# Patient Record
Sex: FEMALE | Race: WHITE | NOT HISPANIC OR LATINO | Employment: OTHER | URBAN - NONMETROPOLITAN AREA
[De-identification: names, ages, dates, MRNs, and addresses within clinical notes are randomized per-mention and may not be internally consistent; named-entity substitution may affect disease eponyms.]

---

## 2017-02-14 RX ORDER — CELECOXIB 100 MG/1
CAPSULE ORAL
Qty: 30 CAP | Refills: 2 | Status: SHIPPED | OUTPATIENT
Start: 2017-02-14 | End: 2017-05-12 | Stop reason: SDUPTHER

## 2017-02-27 RX ORDER — PRAVASTATIN SODIUM 20 MG
TABLET ORAL
Qty: 90 TAB | Refills: 3 | Status: SHIPPED | OUTPATIENT
Start: 2017-02-27 | End: 2017-11-30 | Stop reason: SDUPTHER

## 2017-03-03 RX ORDER — LOVASTATIN 20 MG/1
TABLET ORAL
Qty: 90 TAB | Refills: 1 | Status: SHIPPED | OUTPATIENT
Start: 2017-03-03 | End: 2017-09-05

## 2017-03-06 ENCOUNTER — TELEPHONE (OUTPATIENT)
Dept: MEDICAL GROUP | Facility: PHYSICIAN GROUP | Age: 62
End: 2017-03-06

## 2017-05-12 RX ORDER — CELECOXIB 100 MG/1
CAPSULE ORAL
Qty: 30 CAP | Refills: 5 | Status: SHIPPED | OUTPATIENT
Start: 2017-05-12 | End: 2017-10-31 | Stop reason: SDUPTHER

## 2017-05-15 ENCOUNTER — HOSPITAL ENCOUNTER (OUTPATIENT)
Dept: LAB | Facility: MEDICAL CENTER | Age: 62
End: 2017-05-15
Attending: INTERNAL MEDICINE
Payer: COMMERCIAL

## 2017-05-15 DIAGNOSIS — E66.9 OBESITY (BMI 30-39.9): ICD-10-CM

## 2017-05-15 DIAGNOSIS — E78.00 HIGH CHOLESTEROL: ICD-10-CM

## 2017-05-15 DIAGNOSIS — E55.9 VITAMIN D DEFICIENCY: ICD-10-CM

## 2017-05-15 LAB
25(OH)D3 SERPL-MCNC: 40 NG/ML (ref 30–100)
ALBUMIN SERPL BCP-MCNC: 4.1 G/DL (ref 3.2–4.9)
ALBUMIN/GLOB SERPL: 1.4 G/DL
ALP SERPL-CCNC: 76 U/L (ref 30–99)
ALT SERPL-CCNC: 7 U/L (ref 2–50)
ANION GAP SERPL CALC-SCNC: 6 MMOL/L (ref 0–11.9)
AST SERPL-CCNC: 16 U/L (ref 12–45)
BASOPHILS # BLD AUTO: 0.9 % (ref 0–1.8)
BASOPHILS # BLD: 0.05 K/UL (ref 0–0.12)
BILIRUB SERPL-MCNC: 1.2 MG/DL (ref 0.1–1.5)
BUN SERPL-MCNC: 12 MG/DL (ref 8–22)
CALCIUM SERPL-MCNC: 9.6 MG/DL (ref 8.5–10.5)
CHLORIDE SERPL-SCNC: 105 MMOL/L (ref 96–112)
CHOLEST SERPL-MCNC: 172 MG/DL (ref 100–199)
CO2 SERPL-SCNC: 28 MMOL/L (ref 20–33)
CREAT SERPL-MCNC: 0.58 MG/DL (ref 0.5–1.4)
EOSINOPHIL # BLD AUTO: 0.13 K/UL (ref 0–0.51)
EOSINOPHIL NFR BLD: 2.4 % (ref 0–6.9)
ERYTHROCYTE [DISTWIDTH] IN BLOOD BY AUTOMATED COUNT: 42.8 FL (ref 35.9–50)
GFR SERPL CREATININE-BSD FRML MDRD: >60 ML/MIN/1.73 M 2
GLOBULIN SER CALC-MCNC: 3 G/DL (ref 1.9–3.5)
GLUCOSE SERPL-MCNC: 85 MG/DL (ref 65–99)
HCT VFR BLD AUTO: 41.9 % (ref 37–47)
HDLC SERPL-MCNC: 64 MG/DL
HGB BLD-MCNC: 14 G/DL (ref 12–16)
IMM GRANULOCYTES # BLD AUTO: 0 K/UL (ref 0–0.11)
IMM GRANULOCYTES NFR BLD AUTO: 0 % (ref 0–0.9)
LDLC SERPL CALC-MCNC: 83 MG/DL
LYMPHOCYTES # BLD AUTO: 2.58 K/UL (ref 1–4.8)
LYMPHOCYTES NFR BLD: 46.7 % (ref 22–41)
MCH RBC QN AUTO: 31 PG (ref 27–33)
MCHC RBC AUTO-ENTMCNC: 33.4 G/DL (ref 33.6–35)
MCV RBC AUTO: 92.9 FL (ref 81.4–97.8)
MONOCYTES # BLD AUTO: 0.42 K/UL (ref 0–0.85)
MONOCYTES NFR BLD AUTO: 7.6 % (ref 0–13.4)
NEUTROPHILS # BLD AUTO: 2.35 K/UL (ref 2–7.15)
NEUTROPHILS NFR BLD: 42.4 % (ref 44–72)
NRBC # BLD AUTO: 0 K/UL
NRBC BLD AUTO-RTO: 0 /100 WBC
PLATELET # BLD AUTO: 196 K/UL (ref 164–446)
PMV BLD AUTO: 11.8 FL (ref 9–12.9)
POTASSIUM SERPL-SCNC: 3.9 MMOL/L (ref 3.6–5.5)
PROT SERPL-MCNC: 7.1 G/DL (ref 6–8.2)
RBC # BLD AUTO: 4.51 M/UL (ref 4.2–5.4)
SODIUM SERPL-SCNC: 139 MMOL/L (ref 135–145)
TRIGL SERPL-MCNC: 125 MG/DL (ref 0–149)
WBC # BLD AUTO: 5.5 K/UL (ref 4.8–10.8)

## 2017-05-15 PROCEDURE — 85025 COMPLETE CBC W/AUTO DIFF WBC: CPT

## 2017-05-15 PROCEDURE — 82306 VITAMIN D 25 HYDROXY: CPT

## 2017-05-15 PROCEDURE — 80053 COMPREHEN METABOLIC PANEL: CPT

## 2017-05-15 PROCEDURE — 36415 COLL VENOUS BLD VENIPUNCTURE: CPT

## 2017-05-15 PROCEDURE — 80061 LIPID PANEL: CPT

## 2017-05-22 ENCOUNTER — OFFICE VISIT (OUTPATIENT)
Dept: MEDICAL GROUP | Facility: PHYSICIAN GROUP | Age: 62
End: 2017-05-22
Payer: COMMERCIAL

## 2017-05-22 VITALS
HEART RATE: 72 BPM | BODY MASS INDEX: 31.66 KG/M2 | SYSTOLIC BLOOD PRESSURE: 118 MMHG | TEMPERATURE: 98.2 F | DIASTOLIC BLOOD PRESSURE: 72 MMHG | HEIGHT: 66 IN | WEIGHT: 197 LBS | RESPIRATION RATE: 16 BRPM | OXYGEN SATURATION: 96 %

## 2017-05-22 DIAGNOSIS — R00.2 PALPITATIONS: ICD-10-CM

## 2017-05-22 DIAGNOSIS — I10 ESSENTIAL HYPERTENSION: ICD-10-CM

## 2017-05-22 PROCEDURE — 99214 OFFICE O/P EST MOD 30 MIN: CPT | Performed by: NURSE PRACTITIONER

## 2017-05-22 RX ORDER — DILTIAZEM HYDROCHLORIDE 120 MG/1
CAPSULE, COATED, EXTENDED RELEASE ORAL
Qty: 90 CAP | Refills: 1 | Status: SHIPPED | OUTPATIENT
Start: 2017-05-22 | End: 2017-11-14 | Stop reason: SDUPTHER

## 2017-05-22 ASSESSMENT — PAIN SCALES - GENERAL: PAINLEVEL: NO PAIN

## 2017-05-22 NOTE — PROGRESS NOTES
[facility]  Primary Care Office Visit - Problem-Oriented        History:     Magali Davidson is a 62 y.o. female who is here today to discuss Labs Only      HTN (hypertension)  This is a chronic condition which is well controlled on medications. Patient is tolerating medications without side effects.      Palpitations  This is a chronic condition which is well controlled on medications. Patient is tolerating medications without side effects.            Past Medical History   Diagnosis Date   • High cholesterol    • Insomnia    • Anesthesia      PONV   • Pain      hip   • Cerumen debris on tympanic membrane of both ears 2014     intermittent problem, ok for now, monitor   • Obesity (BMI 30-39.9) 2014   • Vitamin D deficiency 2015   • Varicose vein of leg 2016     Past Surgical History   Procedure Laterality Date   • Knee arthrotomy       right knee   • Hip arthroplasty total       left   • Tubal ligation     • Primary c section          • Appendectomy     • Hip revision total  2012     Performed by Ihsan Leija M.D. at SURGERY [facility]   • Pr inj for sacroiliac jt anesth Left 2015     Procedure: INJ, SACROILIAC, ANES/STEROID;  Surgeon: Geovanni Nina D.O.;  Location: SURGERY SURGICAL [facility] ORS;  Service: Pain Management     Social History     Social History   • Marital Status:      Spouse Name: N/A   • Number of Children: N/A   • Years of Education: N/A     Occupational History   • Not on file.     Social History Main Topics   • Smoking status: Never Smoker    • Smokeless tobacco: Never Used   • Alcohol Use: No      Comment: rare   • Drug Use: No   • Sexual Activity: No     Other Topics Concern   • Not on file     Social History Narrative    ,  smokes.  Retired,  special ed.       History   Smoking status   • Never Smoker    Smokeless tobacco   • Never Used     Family History   Problem Relation Age  "of Onset   • Heart Disease     • Diabetes     • Cancer     • Hypertension     • Hypertension Mother    • Hyperlipidemia Mother    • Heart Disease Father    • Hypertension Father    • Hyperlipidemia Father    • Stroke Father    • Heart Disease Brother    • Hypertension Brother    • Hypertension Brother      Allergies   Allergen Reactions   • Nkda [No Known Drug Allergy]        Problem List:     Patient Active Problem List    Diagnosis Date Noted   • HTN (hypertension) 11/14/2013     Priority: Medium     Class: Chronic   • Varicose vein of leg 02/19/2016   • Vitamin D deficiency 11/19/2015   • Sprain of sacroiliac ligament 08/24/2015   • History of colonic polyps 08/16/2015   • Obesity (BMI 30-39.9) 11/07/2014   • Health care maintenance 09/25/2014   • Palpitations 09/25/2014   • Cerumen debris on tympanic membrane of both ears 09/25/2014   • Hx of total hip arthroplasty 09/26/2012   • High cholesterol    • Insomnia          Medications:     Current outpatient prescriptions:   •  diltiazem CD (CARDIZEM CD) 120 MG CAPSULE SR 24 HR, TAKE ONE CAPSULE BY MOUTH ONE TIME DAILY, Disp: 90 Cap, Rfl: 1  •  celecoxib (CELEBREX) 100 MG Cap, TAKE ONE CAPSULE BY MOUTH ONE TIME DAILY, Disp: 30 Cap, Rfl: 5  •  pravastatin (PRAVACHOL) 20 MG Tab, TAKE 1 TAB BY MOUTH EVERY DAY., Disp: 90 Tab, Rfl: 3  •  zolpidem (AMBIEN) 10 MG Tab, Take 1 Tab by mouth at bedtime as needed., Disp: 30 Each, Rfl: 5  •  cholecalciferol (VITAMIN D3) 5000 UNIT Cap, Take 1 Cap by mouth every day., Disp: 90 Cap, Rfl: 3  •  lovastatin (MEVACOR) 20 MG Tab, TAKE ONE TABLET BY MOUTH ONE TIME DAILY (Patient not taking: Reported on 5/22/2017), Disp: 90 Tab, Rfl: 1      Review of Systems:     Comprehensive ROS negative     Physical Assessment:     VS: /72 mmHg  Pulse 72  Temp(Src) 36.8 °C (98.2 °F)  Resp 16  Ht 1.676 m (5' 6\")  Wt 89.359 kg (197 lb)  BMI 31.81 kg/m2  SpO2 96%    General: Well-developed, well-nourished, female   Head: PERRL, EOMI. " Normocephalic  Cardiovasc:No JVD.  RRR, no MRG. No thrills or bruits. Pulses 2+ and symmetric at all distal extremities.  Pulmonary: Lungs clear bilaterally.  Normal respiratory effort. No wheeze or crackles.   Extremities: No edema, no TTP bilateral calves. Pedal pulses intact. No joint effusions. LEs warm and well-perfused.  Neuro: Alert and oriented, CNs II-XII intact, no focal deficits.  Skin:No rashes noted. Skin warm, dry, intact    Psych: Dressed appropriately for the weather, pleasant and conversant.  Affect, mood & judgment appropriate.      Recent Labs & Imaging:   LABS: Results reviewed and discussed with the patient, all questions answered.      Assessment/Plan:   Magali was seen today for labs only.    Diagnoses and all orders for this visit:    Essential hypertension, chronic, well controlled   - Continue current treatment, follow-up in 6 months  -     diltiazem CD (CARDIZEM CD) 120 MG CAPSULE SR 24 HR; TAKE ONE CAPSULE BY MOUTH ONE TIME DAILY    Palpitations, well controlled  -     diltiazem CD (CARDIZEM CD) 120 MG CAPSULE SR 24 HR; TAKE ONE CAPSULE BY MOUTH ONE TIME DAILY      Patient is agreeable to the above plan and voiced understanding. All questions answered.     Please note that this dictation was created using voice recognition software. I have made every reasonable attempt to correct obvious errors, but I expect that there are errors of grammar and possibly content that I did not discover before finalizing the note.      JM Linda  5/22/2017, 2:05 PM

## 2017-05-22 NOTE — MR AVS SNAPSHOT
"        Magali Chiu Stuart   2017 1:40 PM   Office Visit   MRN: [id number]    Department:  [facility]   Dept Phone:  [phone]    Description:  Female : 1955   Provider:  ANGIE Erwin           Reason for Visit     Labs Only Completed 5/15/17      Allergies as of 2017     Allergen Noted Reactions    Nkda [No Known Drug Allergy] 2012         You were diagnosed with     Essential hypertension   [8762637]       Palpitations   [785.1.ICD-9-CM]         Vital Signs     Blood Pressure Pulse Temperature Respirations Height Weight    118/72 mmHg 72 36.8 °C (98.2 °F) 16 1.676 m (5' 6\") 89.359 kg (197 lb)    Body Mass Index Oxygen Saturation Smoking Status             31.81 kg/m2 96% Never Smoker          Basic Information     Date Of Birth Sex Race Ethnicity Preferred Language    1955 Female White Non- English      Problem List              ICD-10-CM Priority Class Noted - Resolved    High cholesterol E78.00   Unknown - Present    Insomnia G47.00   Unknown - Present    Hx of total hip arthroplasty Z96.649   2012 - Present    HTN (hypertension) I10 Medium Chronic 2013 - Present    Health care maintenance Z00.00   2014 - Present    Palpitations R00.2   2014 - Present    Cerumen debris on tympanic membrane of both ears H61.23   2014 - Present    Obesity (BMI 30-39.9) E66.9   2014 - Present    History of colonic polyps Z86.010   2015 - Present    Sprain of sacroiliac ligament S33.6XXA   2015 - Present    Vitamin D deficiency E55.9   2015 - Present    Varicose vein of leg I83.90   2016 - Present      Health Maintenance        Date Due Completion Dates    PAP SMEAR 2017 (Prv Comp)    Override on 2014: Previously completed (done by another provider normal)    MAMMOGRAM 2018    COLONOSCOPY 2020 (N/S), 2015    Override on 2015: (N/S) (polyp due in )    IMM " DTaP/Tdap/Td Vaccine (2 - Td) 5/19/2026 5/19/2016            Current Immunizations     13-VALENT PCV PREVNAR 4/3/2016    Influenza TIV (IM) 9/27/2012  9:32 PM    SHINGLES VACCINE 4/3/2016    Tdap Vaccine 5/19/2016      Below and/or attached are the medications your provider expects you to take. Review all of your home medications and newly ordered medications with your provider and/or pharmacist. Follow medication instructions as directed by your provider and/or pharmacist. Please keep your medication list with you and share with your provider. Update the information when medications are discontinued, doses are changed, or new medications (including over-the-counter products) are added; and carry medication information at all times in the event of emergency situations     Allergies:  NKDA - (reactions not documented)               Medications  Valid as of: May 22, 2017 -  2:04 PM    Generic Name Brand Name Tablet Size Instructions for use    Celecoxib (Cap) CELEBREX 100 MG TAKE ONE CAPSULE BY MOUTH ONE TIME DAILY        Cholecalciferol (Cap) VITAMIN D3 5000 UNIT Take 1 Cap by mouth every day.        DilTIAZem HCl Coated Beads (CAPSULE SR 24 HR) CARDIZEM  MG TAKE ONE CAPSULE BY MOUTH ONE TIME DAILY        Lovastatin (Tab) MEVACOR 20 MG TAKE ONE TABLET BY MOUTH ONE TIME DAILY        Pravastatin Sodium (Tab) PRAVACHOL 20 MG TAKE 1 TAB BY MOUTH EVERY DAY.        Zolpidem Tartrate (Tab) AMBIEN 10 MG Take 1 Tab by mouth at bedtime as needed.        .                 Medicines prescribed today were sent to:     [facility] [place] [id number] [address]    Phone: [phone] Fax: [phone]    Open 24 Hours?: No      Medication refill instructions:       If your prescription bottle indicates you have medication refills left, it is not necessary to call your provider’s office. Please contact your pharmacy and they will refill your medication.    If your  prescription bottle indicates you do not have any refills left, you may request refills at any time through one of the following ways: The online [employer] system (except Urgent Care), by calling your provider’s office, or by asking your pharmacy to contact your provider’s office with a refill request. Medication refills are processed only during regular business hours and may not be available until the next business day. Your provider may request additional information or to have a follow-up visit with you prior to refilling your medication.   *Please Note: Medication refills are assigned a new Rx number when refilled electronically. Your pharmacy may indicate that no refills were authorized even though a new prescription for the same medication is available at the pharmacy. Please request the medicine by name with the pharmacy before contacting your provider for a refill.           [employer] Access Code: Activation code not generated  Current [employer] Status: Active

## 2017-06-27 ENCOUNTER — RX ONLY (OUTPATIENT)
Age: 62
Setting detail: RX ONLY
End: 2017-06-27

## 2017-06-27 PROBLEM — D23.39 OTHER BENIGN NEOPLASM OF SKIN OF OTHER PARTS OF FACE: Status: ACTIVE | Noted: 2017-06-27

## 2017-07-11 PROBLEM — D49.2 NEOPLASM OF UNSPECIFIED BEHAVIOR OF BONE, SOFT TISSUE, AND SKIN: Status: RESOLVED | Noted: 2017-06-27 | Resolved: 2017-07-11

## 2017-09-05 ENCOUNTER — OFFICE VISIT (OUTPATIENT)
Dept: URGENT CARE | Facility: PHYSICIAN GROUP | Age: 62
End: 2017-09-05
Payer: COMMERCIAL

## 2017-09-05 VITALS
WEIGHT: 194 LBS | SYSTOLIC BLOOD PRESSURE: 144 MMHG | RESPIRATION RATE: 16 BRPM | DIASTOLIC BLOOD PRESSURE: 92 MMHG | HEIGHT: 66 IN | TEMPERATURE: 97.8 F | HEART RATE: 92 BPM | BODY MASS INDEX: 31.18 KG/M2 | OXYGEN SATURATION: 98 %

## 2017-09-05 DIAGNOSIS — H61.23 BILATERAL IMPACTED CERUMEN: ICD-10-CM

## 2017-09-05 DIAGNOSIS — J06.9 UPPER RESPIRATORY TRACT INFECTION, UNSPECIFIED TYPE: ICD-10-CM

## 2017-09-05 PROCEDURE — 99214 OFFICE O/P EST MOD 30 MIN: CPT | Performed by: PHYSICIAN ASSISTANT

## 2017-09-05 RX ORDER — FLUTICASONE PROPIONATE 50 MCG
2 SPRAY, SUSPENSION (ML) NASAL DAILY
Qty: 1 BOTTLE | Refills: 1 | Status: SHIPPED | OUTPATIENT
Start: 2017-09-05 | End: 2018-01-05 | Stop reason: SDUPTHER

## 2017-09-05 ASSESSMENT — ENCOUNTER SYMPTOMS
CHILLS: 0
SINUS PAIN: 0
SORE THROAT: 0
RHINORRHEA: 1
WHEEZING: 0
FEVER: 0
COUGH: 0

## 2017-09-05 NOTE — PATIENT INSTRUCTIONS
"  Use Tylenol and/or ibuprofen as needed for pain or fever.  Use a Abhishek Med, Neti Pot, or other nasal irrigation device daily.  Use Flonase daily.  May try a short course of a decongestant such as Sudafed.  May try Afrin nasal spray for 3-5 days and then discontinue use.  May try an over-the-counter antihistamine such as Zyrtec, Claritin, or Allegra.  Use a cool mist humidifier with distilled water.  Elevate the head of your bed a few inches.  Drink plenty of fluids and get adequate rest.  Follow up with primary care provider in a few days if not improving.  Return for new or worsening symptoms.    Upper Respiratory Infection, Adult  Most upper respiratory infections (URIs) are a viral infection of the air passages leading to the lungs. A URI affects the nose, throat, and upper air passages. The most common type of URI is nasopharyngitis and is typically referred to as \"the common cold.\"  URIs run their course and usually go away on their own. Most of the time, a URI does not require medical attention, but sometimes a bacterial infection in the upper airways can follow a viral infection. This is called a secondary infection. Sinus and middle ear infections are common types of secondary upper respiratory infections.  Bacterial pneumonia can also complicate a URI. A URI can worsen asthma and chronic obstructive pulmonary disease (COPD). Sometimes, these complications can require emergency medical care and may be life threatening.   CAUSES  Almost all URIs are caused by viruses. A virus is a type of germ and can spread from one person to another.   RISKS FACTORS  You may be at risk for a URI if:   · You smoke.    · You have chronic heart or lung disease.  · You have a weakened defense (immune) system.    · You are very young or very old.    · You have nasal allergies or asthma.  · You work in crowded or poorly ventilated areas.  · You work in health care facilities or schools.  SIGNS AND SYMPTOMS   Symptoms typically " develop 2-3 days after you come in contact with a cold virus. Most viral URIs last 7-10 days. However, viral URIs from the influenza virus (flu virus) can last 14-18 days and are typically more severe. Symptoms may include:   · Runny or stuffy (congested) nose.    · Sneezing.    · Cough.    · Sore throat.    · Headache.    · Fatigue.    · Fever.    · Loss of appetite.    · Pain in your forehead, behind your eyes, and over your cheekbones (sinus pain).  · Muscle aches.    DIAGNOSIS   Your health care provider may diagnose a URI by:  · Physical exam.  · Tests to check that your symptoms are not due to another condition such as:  ¨ Strep throat.  ¨ Sinusitis.  ¨ Pneumonia.  ¨ Asthma.  TREATMENT   A URI goes away on its own with time. It cannot be cured with medicines, but medicines may be prescribed or recommended to relieve symptoms. Medicines may help:  · Reduce your fever.  · Reduce your cough.  · Relieve nasal congestion.  HOME CARE INSTRUCTIONS   · Take medicines only as directed by your health care provider.    · Gargle warm saltwater or take cough drops to comfort your throat as directed by your health care provider.  · Use a warm mist humidifier or inhale steam from a shower to increase air moisture. This may make it easier to breathe.  · Drink enough fluid to keep your urine clear or pale yellow.    · Eat soups and other clear broths and maintain good nutrition.    · Rest as needed.    · Return to work when your temperature has returned to normal or as your health care provider advises. You may need to stay home longer to avoid infecting others. You can also use a face mask and careful hand washing to prevent spread of the virus.  · Increase the usage of your inhaler if you have asthma.    · Do not use any tobacco products, including cigarettes, chewing tobacco, or electronic cigarettes. If you need help quitting, ask your health care provider.  PREVENTION   The best way to protect yourself from getting a cold  is to practice good hygiene.   · Avoid oral or hand contact with people with cold symptoms.    · Wash your hands often if contact occurs.    There is no clear evidence that vitamin C, vitamin E, echinacea, or exercise reduces the chance of developing a cold. However, it is always recommended to get plenty of rest, exercise, and practice good nutrition.   SEEK MEDICAL CARE IF:   · You are getting worse rather than better.    · Your symptoms are not controlled by medicine.    · You have chills.  · You have worsening shortness of breath.  · You have brown or red mucus.  · You have yellow or brown nasal discharge.  · You have pain in your face, especially when you bend forward.  · You have a fever.  · You have swollen neck glands.  · You have pain while swallowing.  · You have white areas in the back of your throat.  SEEK IMMEDIATE MEDICAL CARE IF:   · You have severe or persistent:  ¨ Headache.  ¨ Ear pain.  ¨ Sinus pain.  ¨ Chest pain.  · You have chronic lung disease and any of the following:  ¨ Wheezing.  ¨ Prolonged cough.  ¨ Coughing up blood.  ¨ A change in your usual mucus.  · You have a stiff neck.  · You have changes in your:  ¨ Vision.  ¨ Hearing.  ¨ Thinking.  ¨ Mood.  MAKE SURE YOU:   · Understand these instructions.  · Will watch your condition.  · Will get help right away if you are not doing well or get worse.     This information is not intended to replace advice given to you by your health care provider. Make sure you discuss any questions you have with your health care provider.     Document Released: 06/13/2002 Document Revised: 05/03/2016 Document Reviewed: 03/25/2015  [employer] Interactive Patient Education ©2016 [employer]      Cerumen Impaction  The structures of the external ear canal secrete a waxy substance known as cerumen. Excess cerumen can build up in the ear canal, causing a condition known as cerumen impaction. Cerumen impaction can cause ear pain and disrupt the function of the  ear.  The rate of cerumen production differs for each individual. In certain individuals, the configuration of the ear canal may decrease his or her ability to naturally remove cerumen.  CAUSES  Cerumen impaction is caused by excessive cerumen production or buildup.  RISK FACTORS  · Frequent use of swabs to clean ears.  · Having narrow ear canals.  · Having eczema.  · Being dehydrated.  SIGNS AND SYMPTOMS  · Diminished hearing.  · Ear drainage.  · Ear pain.  · Ear itch.  TREATMENT  Treatment may involve:  · Over-the-counter or prescription ear drops to soften the cerumen.  · Removal of cerumen by a health care provider. This may be done with:  ¨ Irrigation with warm water. This is the most common method of removal.  ¨ Ear curettes and other instruments.  ¨ Surgery. This may be done in severe cases.  HOME CARE INSTRUCTIONS  · Take medicines only as directed by your health care provider.  · Do not insert objects into the ear with the intent of cleaning the ear.  PREVENTION  · Do not insert objects into the ear, even with the intent of cleaning the ear. Removing cerumen as a part of normal hygiene is not necessary, and the use of swabs in the ear canal is not recommended.  · Drink enough water to keep your urine clear or pale yellow.  · Control your eczema if you have it.  SEEK MEDICAL CARE IF:  · You develop ear pain.  · You develop bleeding from the ear.  · The cerumen does not clear after you use ear drops as directed.     This information is not intended to replace advice given to you by your health care provider. Make sure you discuss any questions you have with your health care provider.     Document Released: 01/25/2006 Document Revised: 01/08/2016 Document Reviewed: 03/17/2011  [employer] Interactive Patient Education ©2016 [employer]

## 2017-09-05 NOTE — PROGRESS NOTES
Subjective:      Magali Davidson is a 62 y.o. female who presents with Nasal Congestion (4 days )            4 day history of rhinorrhea and nasal congestion. No fevers, chills, shortness breath, wheezing, cough, sore throat, or other complaints.      URI    This is a new problem. The current episode started in the past 7 days. The problem has been waxing and waning. There has been no fever. Associated symptoms include congestion and rhinorrhea. Pertinent negatives include no coughing, ear pain, sinus pain, sore throat or wheezing. She has tried nothing for the symptoms. The treatment provided no relief.       Review of Systems   Constitutional: Negative for chills and fever.   HENT: Positive for congestion and rhinorrhea. Negative for ear pain and sore throat.    Respiratory: Negative for cough and wheezing.      Allergies:Nkda [no known drug allergy]    Current Outpatient Prescriptions Ordered in [facility]   Medication Sig Dispense Refill   • fluticasone (FLONASE) 50 MCG/ACT nasal spray Spray 2 Sprays in nose every day. 1 Bottle 1   • diltiazem CD (CARDIZEM CD) 120 MG CAPSULE SR 24 HR TAKE ONE CAPSULE BY MOUTH ONE TIME DAILY 90 Cap 1   • celecoxib (CELEBREX) 100 MG Cap TAKE ONE CAPSULE BY MOUTH ONE TIME DAILY 30 Cap 5   • pravastatin (PRAVACHOL) 20 MG Tab TAKE 1 TAB BY MOUTH EVERY DAY. 90 Tab 3   • cholecalciferol (VITAMIN D3) 5000 UNIT Cap Take 1 Cap by mouth every day. 90 Cap 3   • zolpidem (AMBIEN) 10 MG Tab Take 1 Tab by mouth at bedtime as needed. 30 Each 5     No current Epic-ordered facility-administered medications on file.        Past Medical History:   Diagnosis Date   • Varicose vein of leg 2/19/2016   • Vitamin D deficiency 11/19/2015   • Obesity (BMI 30-39.9) 11/7/2014   • Cerumen debris on tympanic membrane of both ears 9/25/2014    intermittent problem, ok for now, monitor   • Anesthesia     PONV   • High cholesterol    • Insomnia    • Pain     hip       Social History   Substance Use Topics   •  "Smoking status: Never Smoker   • Smokeless tobacco: Never Used   • Alcohol use No      Comment: rare       Family Status   Relation Status   • Mother Alive   • Father  at age 73    [place]ke   • Brother Alive    4 stents   • Paternal Uncle     MI   • Paternal Grandmother     MI   • Brother Alive   • Son Alive    Pericarditis at age 24   •       Family History   Problem Relation Age of Onset   • Hypertension Mother    • Hyperlipidemia Mother    • Heart Disease Father    • Hypertension Father    • Hyperlipidemia Father    • Stroke Father    • Heart Disease Brother    • Hypertension Brother    • Hypertension Brother    • Heart Disease     • Diabetes     • Cancer     • Hypertension            Objective:     /92   Pulse 92   Temp 36.6 °C (97.8 °F)   Resp 16   Ht 1.676 m (5' 6\")   Wt 88 kg (194 lb)   SpO2 98%   Breastfeeding? No   BMI 31.31 kg/m²      Physical Exam   Constitutional: She appears well-developed and well-nourished. No distress.   HENT:   Head: Normocephalic and atraumatic.   Right Ear: External ear normal.   Left Ear: External ear normal.   Mouth/Throat: Oropharynx is clear and moist.   Canals completely obstructed by cerumen. After removal, canals and tympanic membranes unremarkable. Mild nasal mucosal edema with clear nasal mucus   Eyes: Right eye exhibits no discharge. Left eye exhibits no discharge.   Neck: Normal range of motion. Neck supple.   Cardiovascular: Normal rate and regular rhythm.    Pulmonary/Chest: Effort normal and breath sounds normal.   Skin: Skin is warm and dry. She is not diaphoretic.   Psychiatric: She has a normal mood and affect. Her behavior is normal. Judgment and thought content normal.   Nursing note and vitals reviewed.              Assessment/Plan:     1. Upper respiratory tract infection, unspecified type  fluticasone (FLONASE) 50 MCG/ACT nasal spray    Fluctuating for 4 days. No fever. Likely viral. Given written instructions. Follow-up " with PCP. Return if worsening   2. Bilateral impacted cerumen      Canals cleared in clinic. Given written instructions. Follow-up as needed       [employer] Interactive Patient Education given:URI, cerumen impaction    Please note that this dictation was created using voice recognition software. I have made every reasonable attempt to correct obvious errors, but I expect that there are errors of grammar and possibly content that I did not discover before finalizing the note.

## 2017-10-31 RX ORDER — CELECOXIB 100 MG/1
CAPSULE ORAL
Qty: 30 CAP | Refills: 5 | Status: SHIPPED | OUTPATIENT
Start: 2017-10-31 | End: 2017-11-30 | Stop reason: SDUPTHER

## 2017-11-14 DIAGNOSIS — I10 ESSENTIAL HYPERTENSION: ICD-10-CM

## 2017-11-14 DIAGNOSIS — R00.2 PALPITATIONS: ICD-10-CM

## 2017-11-14 RX ORDER — DILTIAZEM HYDROCHLORIDE 120 MG/1
CAPSULE, COATED, EXTENDED RELEASE ORAL
Qty: 90 CAP | Refills: 0 | Status: SHIPPED | OUTPATIENT
Start: 2017-11-14 | End: 2017-11-30 | Stop reason: SDUPTHER

## 2017-11-14 NOTE — TELEPHONE ENCOUNTER
Was the patient seen in the last year in this department? Yes     Does patient have an active prescription for medications requested? No     Received Request Via: Pharmacy      Pt met protocol?: Yes pt last ov 5/17   BP Readings from Last 1 Encounters:   09/05/17 144/92

## 2017-11-20 ENCOUNTER — HOSPITAL ENCOUNTER (OUTPATIENT)
Dept: LAB | Facility: MEDICAL CENTER | Age: 62
End: 2017-11-20
Attending: INTERNAL MEDICINE
Payer: COMMERCIAL

## 2017-11-20 DIAGNOSIS — I10 ESSENTIAL HYPERTENSION: ICD-10-CM

## 2017-11-20 DIAGNOSIS — E78.00 HIGH CHOLESTEROL: ICD-10-CM

## 2017-11-20 DIAGNOSIS — E55.9 VITAMIN D DEFICIENCY: ICD-10-CM

## 2017-11-20 LAB
25(OH)D3 SERPL-MCNC: 38 NG/ML (ref 30–100)
ALBUMIN SERPL BCP-MCNC: 4 G/DL (ref 3.2–4.9)
ALBUMIN/GLOB SERPL: 1.3 G/DL
ALP SERPL-CCNC: 87 U/L (ref 30–99)
ALT SERPL-CCNC: 8 U/L (ref 2–50)
ANION GAP SERPL CALC-SCNC: 8 MMOL/L (ref 0–11.9)
AST SERPL-CCNC: 17 U/L (ref 12–45)
BASOPHILS # BLD AUTO: 1.1 % (ref 0–1.8)
BASOPHILS # BLD: 0.06 K/UL (ref 0–0.12)
BILIRUB SERPL-MCNC: 1.1 MG/DL (ref 0.1–1.5)
BUN SERPL-MCNC: 16 MG/DL (ref 8–22)
CALCIUM SERPL-MCNC: 9.6 MG/DL (ref 8.5–10.5)
CHLORIDE SERPL-SCNC: 106 MMOL/L (ref 96–112)
CHOLEST SERPL-MCNC: 169 MG/DL (ref 100–199)
CO2 SERPL-SCNC: 28 MMOL/L (ref 20–33)
CREAT SERPL-MCNC: 0.66 MG/DL (ref 0.5–1.4)
EOSINOPHIL # BLD AUTO: 0.12 K/UL (ref 0–0.51)
EOSINOPHIL NFR BLD: 2.1 % (ref 0–6.9)
ERYTHROCYTE [DISTWIDTH] IN BLOOD BY AUTOMATED COUNT: 43.2 FL (ref 35.9–50)
GFR SERPL CREATININE-BSD FRML MDRD: >60 ML/MIN/1.73 M 2
GLOBULIN SER CALC-MCNC: 3 G/DL (ref 1.9–3.5)
GLUCOSE SERPL-MCNC: 82 MG/DL (ref 65–99)
HCT VFR BLD AUTO: 42.7 % (ref 37–47)
HDLC SERPL-MCNC: 65 MG/DL
HGB BLD-MCNC: 14 G/DL (ref 12–16)
IMM GRANULOCYTES # BLD AUTO: 0.01 K/UL (ref 0–0.11)
IMM GRANULOCYTES NFR BLD AUTO: 0.2 % (ref 0–0.9)
LDLC SERPL CALC-MCNC: 89 MG/DL
LYMPHOCYTES # BLD AUTO: 2.25 K/UL (ref 1–4.8)
LYMPHOCYTES NFR BLD: 39.4 % (ref 22–41)
MCH RBC QN AUTO: 31 PG (ref 27–33)
MCHC RBC AUTO-ENTMCNC: 32.8 G/DL (ref 33.6–35)
MCV RBC AUTO: 94.5 FL (ref 81.4–97.8)
MONOCYTES # BLD AUTO: 0.48 K/UL (ref 0–0.85)
MONOCYTES NFR BLD AUTO: 8.4 % (ref 0–13.4)
NEUTROPHILS # BLD AUTO: 2.79 K/UL (ref 2–7.15)
NEUTROPHILS NFR BLD: 48.8 % (ref 44–72)
NRBC # BLD AUTO: 0 K/UL
NRBC BLD AUTO-RTO: 0 /100 WBC
PLATELET # BLD AUTO: 218 K/UL (ref 164–446)
PMV BLD AUTO: 12.1 FL (ref 9–12.9)
POTASSIUM SERPL-SCNC: 4.3 MMOL/L (ref 3.6–5.5)
PROT SERPL-MCNC: 7 G/DL (ref 6–8.2)
RBC # BLD AUTO: 4.52 M/UL (ref 4.2–5.4)
SODIUM SERPL-SCNC: 142 MMOL/L (ref 135–145)
TRIGL SERPL-MCNC: 77 MG/DL (ref 0–149)
WBC # BLD AUTO: 5.7 K/UL (ref 4.8–10.8)

## 2017-11-20 PROCEDURE — 80053 COMPREHEN METABOLIC PANEL: CPT

## 2017-11-20 PROCEDURE — 85025 COMPLETE CBC W/AUTO DIFF WBC: CPT

## 2017-11-20 PROCEDURE — 82306 VITAMIN D 25 HYDROXY: CPT

## 2017-11-20 PROCEDURE — 36415 COLL VENOUS BLD VENIPUNCTURE: CPT

## 2017-11-20 PROCEDURE — 80061 LIPID PANEL: CPT

## 2017-11-30 ENCOUNTER — OFFICE VISIT (OUTPATIENT)
Dept: MEDICAL GROUP | Facility: PHYSICIAN GROUP | Age: 62
End: 2017-11-30
Payer: COMMERCIAL

## 2017-11-30 VITALS
BODY MASS INDEX: 31.18 KG/M2 | HEART RATE: 77 BPM | SYSTOLIC BLOOD PRESSURE: 100 MMHG | WEIGHT: 194 LBS | DIASTOLIC BLOOD PRESSURE: 80 MMHG | RESPIRATION RATE: 16 BRPM | HEIGHT: 66 IN | TEMPERATURE: 98.6 F | OXYGEN SATURATION: 95 %

## 2017-11-30 DIAGNOSIS — R00.2 PALPITATIONS: ICD-10-CM

## 2017-11-30 DIAGNOSIS — E78.00 HIGH CHOLESTEROL: ICD-10-CM

## 2017-11-30 DIAGNOSIS — E66.9 OBESITY (BMI 30-39.9): ICD-10-CM

## 2017-11-30 DIAGNOSIS — I10 ESSENTIAL HYPERTENSION: ICD-10-CM

## 2017-11-30 DIAGNOSIS — Z96.643 HISTORY OF TOTAL REPLACEMENT OF BOTH HIP JOINTS: ICD-10-CM

## 2017-11-30 PROCEDURE — 99214 OFFICE O/P EST MOD 30 MIN: CPT | Performed by: INTERNAL MEDICINE

## 2017-11-30 RX ORDER — DILTIAZEM HYDROCHLORIDE 120 MG/1
CAPSULE, COATED, EXTENDED RELEASE ORAL
Qty: 90 CAP | Refills: 3 | Status: SHIPPED | OUTPATIENT
Start: 2017-11-30 | End: 2018-12-20 | Stop reason: SDUPTHER

## 2017-11-30 RX ORDER — CELECOXIB 100 MG/1
100 CAPSULE ORAL DAILY
Qty: 90 CAP | Refills: 3 | Status: SHIPPED | OUTPATIENT
Start: 2017-11-30 | End: 2018-09-13 | Stop reason: SDUPTHER

## 2017-11-30 RX ORDER — PRAVASTATIN SODIUM 20 MG
20 TABLET ORAL
Qty: 90 TAB | Refills: 3 | Status: SHIPPED | OUTPATIENT
Start: 2017-11-30 | End: 2019-01-29 | Stop reason: SDUPTHER

## 2017-11-30 ASSESSMENT — PATIENT HEALTH QUESTIONNAIRE - PHQ9: CLINICAL INTERPRETATION OF PHQ2 SCORE: 0

## 2017-11-30 ASSESSMENT — PAIN SCALES - GENERAL: PAINLEVEL: NO PAIN

## 2017-11-30 NOTE — ASSESSMENT & PLAN NOTE
Patient not following at home.  Reports some occasional palpitations, usually in clusters for a few days and then not for several mos. She denies fatigue or lite headed sensations.

## 2017-11-30 NOTE — PROGRESS NOTES
Chief Complaint   Patient presents with   • Hypertension     medication FV/ lab results        HISTORY OF PRESENT ILLNESS: Patient is a 62 y.o. female established patient who presents today to discuss the medical issues below.    HTN (hypertension)  Patient not following at home.  Reports some occasional palpitations, usually in clusters for a few days and then not for several mos. She denies fatigue or lite headed sensations.      High cholesterol  Patient continues on the pravastatin at 20 mg had labs, working on the diet and weight but a bit frustrated recently.      Palpitations  States rare brief palpitations.      Hx of total hip arthroplasty  Patient taking the celebrex 100 mg daily  She has aching if cold, not exercising as much.      Obesity (BMI 30-39.9)  Continues to work on diet.  Feels stalled.        Patient Active Problem List    Diagnosis Date Noted   • HTN (hypertension) 11/14/2013     Priority: Medium     Class: Chronic   • Varicose vein of leg 02/19/2016   • Vitamin D deficiency 11/19/2015   • Sprain of sacroiliac ligament 08/24/2015   • History of colonic polyps 08/16/2015   • Obesity (BMI 30-39.9) 11/07/2014   • Health care maintenance 09/25/2014   • Palpitations 09/25/2014   • Cerumen debris on tympanic membrane of both ears 09/25/2014   • Hx of total hip arthroplasty 09/26/2012   • High cholesterol    • Insomnia        Allergies:Nkda [no known drug allergy]    Current Outpatient Prescriptions   Medication Sig Dispense Refill   • diltiazem CD (CARDIZEM CD) 120 MG CAPSULE SR 24 HR TAKE ONE CAPSULE BY MOUTH ONE TIME DAILY 90 Cap 3   • celecoxib (CELEBREX) 100 MG Cap Take 1 Cap by mouth every day. 90 Cap 3   • pravastatin (PRAVACHOL) 20 MG Tab Take 1 Tab by mouth every day. TAKE 1 TAB BY MOUTH EVERY DAY. 90 Tab 3   • cholecalciferol (VITAMIN D3) 5000 UNIT Cap Take 1 Cap by mouth every day. 90 Cap 3   • fluticasone (FLONASE) 50 MCG/ACT nasal spray Spray 2 Sprays in nose every day. 1 Bottle 1   •  "zolpidem (AMBIEN) 10 MG Tab Take 1 Tab by mouth at bedtime as needed. 30 Each 5     No current facility-administered medications for this visit.          Past Medical History:   Diagnosis Date   • Anesthesia     PONV   • Cerumen debris on tympanic membrane of both ears 2014    intermittent problem, ok for now, monitor   • High cholesterol    • Insomnia    • Obesity (BMI 30-39.9) 2014   • Pain     hip   • Varicose vein of leg 2016   • Vitamin D deficiency 2015       Social History   Substance Use Topics   • Smoking status: Never Smoker   • Smokeless tobacco: Never Used   • Alcohol use No      Comment: rare       Family Status   Relation Status   • Mother Alive   • Father  at age 73    Stoke   • Brother Alive    4 stents   • Paternal Uncle     MI   • Paternal Grandmother     MI   • Brother Alive   • Son Alive    Pericarditis at age 24   •       Family History   Problem Relation Age of Onset   • Hypertension Mother    • Hyperlipidemia Mother    • Heart Disease Father    • Hypertension Father    • Hyperlipidemia Father    • Stroke Father    • Heart Disease Brother    • Hypertension Brother    • Hypertension Brother    • Heart Disease     • Diabetes     • Cancer     • Hypertension         ROS:    Respiratory: Negative for cough, sputum production, shortness of breath or wheezing.    Cardiovascular: Negative for chest pain, palpitations, orthopnea, dyspnea with exertion or edema.   Gastrointestinal: Negative for GI upset, nausea, vomiting, abdominal pain, constipation or diarrhea.   Genitourinary: Negative for dysuria, urgency, hesitancy or frequency.       Exam:    Blood pressure 100/80, pulse 77, temperature 37 °C (98.6 °F), resp. rate 16, height 1.676 m (5' 6\"), weight 88 kg (194 lb), SpO2 95 %.  General:  Well nourished, well developed female in NAD.  HENT: Normocephalic, bilateral TMs are intact, nasal and oral mucosa with no lesions,   Neck: Supple without bruit. Thyroid " is not enlarged.  Pulmonary: Clear to ausculation and percussion.  Normal effort. No rales, rhonchi, or wheezing.  Cardiovascular: Regular rate and rhythm without murmur.   Abdomen: Normal bowel sounds soft and nontender no palpable liver spleen bladder mass.  Extremities: No LE edema noted.  Neuro: Grossly nonfocal.  Psych: Alert and oriented to person, place, and time. Appropriate mood and conversation.  LABS: Results reviewed and discussed with the patient, questions answered.      This dictation was created using voice recognition software. I have made reasonable attempts to correct errors, however, errors of grammar and content may exist.          Assessment/Plan:    1. Essential hypertension  Blood pressure on the low side. Currently on the diltiazem for palpitations and this is significantly improved. She is asymptomatic with the lower blood pressures discussed ongoing monitoring. No change to dosage currently.  - COMP METABOLIC PANEL; Future  - CBC WITH DIFFERENTIAL; Future  - diltiazem CD (CARDIZEM CD) 120 MG CAPSULE SR 24 HR; TAKE ONE CAPSULE BY MOUTH ONE TIME DAILY  Dispense: 90 Cap; Refill: 3    2. High cholesterol  Excellent control consideration for decreasing dose of pravastatin but the patient opts for continuing on the 20 mg, ongoing monitoring.  - LIPID PROFILE; Future    3. Palpitations  Clinically stable tolerating the diltiazem ongoing monitoring no change  - diltiazem CD (CARDIZEM CD) 120 MG CAPSULE SR 24 HR; TAKE ONE CAPSULE BY MOUTH ONE TIME DAILY  Dispense: 90 Cap; Refill: 3    4. History of total replacement of both hip joints  Intermittent aches and pains patient concerned about potential risk for long-term Celebrex therapy reviewed risks benefits and ongoing monitoring.    5. Obesity (BMI 30-39.9)  Benefits of further weight loss tools techniques diet exercise weight loss discussed  - Patient identified as having weight management issue.  Appropriate orders and counseling given.        Patient was seen for  25 minutes face to face of which more than 50% of the time was spent in counseling and coordination of care regarding the above problems.

## 2017-11-30 NOTE — ASSESSMENT & PLAN NOTE
Patient continues on the pravastatin at 20 mg had labs, working on the diet and weight but a bit frustrated recently.

## 2018-01-05 DIAGNOSIS — J06.9 UPPER RESPIRATORY TRACT INFECTION, UNSPECIFIED TYPE: ICD-10-CM

## 2018-01-05 RX ORDER — FLUTICASONE PROPIONATE 50 MCG
2 SPRAY, SUSPENSION (ML) NASAL DAILY
Qty: 3 BOTTLE | Refills: 3 | Status: SHIPPED | OUTPATIENT
Start: 2018-01-05 | End: 2020-09-02

## 2018-01-05 NOTE — TELEPHONE ENCOUNTER
Was the patient seen in the last year in this department? Yes     Does patient have an active prescription for medications requested? No     Received Request Via: Pharmacy      Pt met protocol?: Yes    OV  11/17

## 2018-05-24 ENCOUNTER — HOSPITAL ENCOUNTER (OUTPATIENT)
Dept: LAB | Facility: MEDICAL CENTER | Age: 63
End: 2018-05-24
Attending: INTERNAL MEDICINE
Payer: COMMERCIAL

## 2018-05-24 DIAGNOSIS — E78.00 HIGH CHOLESTEROL: ICD-10-CM

## 2018-05-24 DIAGNOSIS — I10 ESSENTIAL HYPERTENSION: ICD-10-CM

## 2018-05-24 LAB
ALBUMIN SERPL BCP-MCNC: 4.1 G/DL (ref 3.2–4.9)
ALBUMIN/GLOB SERPL: 1.5 G/DL
ALP SERPL-CCNC: 86 U/L (ref 30–99)
ALT SERPL-CCNC: 9 U/L (ref 2–50)
ANION GAP SERPL CALC-SCNC: 6 MMOL/L (ref 0–11.9)
AST SERPL-CCNC: 18 U/L (ref 12–45)
BASOPHILS # BLD AUTO: 0.9 % (ref 0–1.8)
BASOPHILS # BLD: 0.05 K/UL (ref 0–0.12)
BILIRUB SERPL-MCNC: 1.5 MG/DL (ref 0.1–1.5)
BUN SERPL-MCNC: 16 MG/DL (ref 8–22)
CALCIUM SERPL-MCNC: 9.6 MG/DL (ref 8.5–10.5)
CHLORIDE SERPL-SCNC: 107 MMOL/L (ref 96–112)
CHOLEST SERPL-MCNC: 189 MG/DL (ref 100–199)
CO2 SERPL-SCNC: 27 MMOL/L (ref 20–33)
CREAT SERPL-MCNC: 0.75 MG/DL (ref 0.5–1.4)
EOSINOPHIL # BLD AUTO: 0.1 K/UL (ref 0–0.51)
EOSINOPHIL NFR BLD: 1.9 % (ref 0–6.9)
ERYTHROCYTE [DISTWIDTH] IN BLOOD BY AUTOMATED COUNT: 42.5 FL (ref 35.9–50)
GLOBULIN SER CALC-MCNC: 2.8 G/DL (ref 1.9–3.5)
GLUCOSE SERPL-MCNC: 79 MG/DL (ref 65–99)
HCT VFR BLD AUTO: 42.2 % (ref 37–47)
HDLC SERPL-MCNC: 68 MG/DL
HGB BLD-MCNC: 14.1 G/DL (ref 12–16)
IMM GRANULOCYTES # BLD AUTO: 0.01 K/UL (ref 0–0.11)
IMM GRANULOCYTES NFR BLD AUTO: 0.2 % (ref 0–0.9)
LDLC SERPL CALC-MCNC: 102 MG/DL
LYMPHOCYTES # BLD AUTO: 2.39 K/UL (ref 1–4.8)
LYMPHOCYTES NFR BLD: 44.4 % (ref 22–41)
MCH RBC QN AUTO: 30.8 PG (ref 27–33)
MCHC RBC AUTO-ENTMCNC: 33.4 G/DL (ref 33.6–35)
MCV RBC AUTO: 92.1 FL (ref 81.4–97.8)
MONOCYTES # BLD AUTO: 0.42 K/UL (ref 0–0.85)
MONOCYTES NFR BLD AUTO: 7.8 % (ref 0–13.4)
NEUTROPHILS # BLD AUTO: 2.41 K/UL (ref 2–7.15)
NEUTROPHILS NFR BLD: 44.8 % (ref 44–72)
NRBC # BLD AUTO: 0 K/UL
NRBC BLD-RTO: 0 /100 WBC
PLATELET # BLD AUTO: 209 K/UL (ref 164–446)
PMV BLD AUTO: 11.9 FL (ref 9–12.9)
POTASSIUM SERPL-SCNC: 4.3 MMOL/L (ref 3.6–5.5)
PROT SERPL-MCNC: 6.9 G/DL (ref 6–8.2)
RBC # BLD AUTO: 4.58 M/UL (ref 4.2–5.4)
SODIUM SERPL-SCNC: 140 MMOL/L (ref 135–145)
TRIGL SERPL-MCNC: 95 MG/DL (ref 0–149)
WBC # BLD AUTO: 5.4 K/UL (ref 4.8–10.8)

## 2018-05-24 PROCEDURE — 80061 LIPID PANEL: CPT

## 2018-05-24 PROCEDURE — 36415 COLL VENOUS BLD VENIPUNCTURE: CPT

## 2018-05-24 PROCEDURE — 80053 COMPREHEN METABOLIC PANEL: CPT

## 2018-05-24 PROCEDURE — 85025 COMPLETE CBC W/AUTO DIFF WBC: CPT

## 2018-06-07 ENCOUNTER — OFFICE VISIT (OUTPATIENT)
Dept: MEDICAL GROUP | Facility: PHYSICIAN GROUP | Age: 63
End: 2018-06-07
Payer: COMMERCIAL

## 2018-06-07 VITALS
HEART RATE: 69 BPM | RESPIRATION RATE: 16 BRPM | OXYGEN SATURATION: 97 % | BODY MASS INDEX: 33.43 KG/M2 | SYSTOLIC BLOOD PRESSURE: 124 MMHG | WEIGHT: 208 LBS | DIASTOLIC BLOOD PRESSURE: 80 MMHG | HEIGHT: 66 IN | TEMPERATURE: 98.1 F

## 2018-06-07 DIAGNOSIS — Z96.643 HISTORY OF TOTAL REPLACEMENT OF BOTH HIP JOINTS: ICD-10-CM

## 2018-06-07 DIAGNOSIS — F51.01 PRIMARY INSOMNIA: ICD-10-CM

## 2018-06-07 DIAGNOSIS — E78.00 HIGH CHOLESTEROL: ICD-10-CM

## 2018-06-07 DIAGNOSIS — Z12.39 BREAST CANCER SCREENING: ICD-10-CM

## 2018-06-07 DIAGNOSIS — I10 ESSENTIAL HYPERTENSION: ICD-10-CM

## 2018-06-07 DIAGNOSIS — E66.9 OBESITY (BMI 30-39.9): ICD-10-CM

## 2018-06-07 DIAGNOSIS — E55.9 VITAMIN D DEFICIENCY: ICD-10-CM

## 2018-06-07 PROCEDURE — 99214 OFFICE O/P EST MOD 30 MIN: CPT | Performed by: INTERNAL MEDICINE

## 2018-06-07 RX ORDER — CHLORAL HYDRATE 500 MG
1000 CAPSULE ORAL
COMMUNITY

## 2018-06-07 ASSESSMENT — PAIN SCALES - GENERAL: PAINLEVEL: NO PAIN

## 2018-06-07 NOTE — ASSESSMENT & PLAN NOTE
Patient continues on medications diltiazem 120 mg a day.  Not following at home no chest pain palpitations or edema.

## 2018-06-07 NOTE — PROGRESS NOTES
Chief Complaint   Patient presents with   • Hypertension     lab results        HISTORY OF PRESENT ILLNESS: Patient is a 63 y.o. female established patient who presents today to discuss the medical issues below.    HTN (hypertension)  Patient continues on medications diltiazem 120 mg a day.  Not following at home no chest pain palpitations or edema.    Vitamin D deficiency  Continues on vitamin D supplementation 5000 international units a day.  Had lab work done.    Obesity (BMI 30-39.9)  Struggling with diet weight has increased.  patient reports she is just back from vacation.     High cholesterol  Patient continues on pravastatin 20 mg a day.  Weight is up.  Supplements with omega-3.    Insomnia  Patient has self discontinued the Ambien.  She reports she has trouble sleeping and then up and down due to the hip aching.  She has just started trial some melatonin.    Hx of total hip arthroplasty  s/p hip replacement, still aching using the Celebrex.       Patient Active Problem List    Diagnosis Date Noted   • Vitamin D deficiency 11/19/2015     Priority: Medium   • Obesity (BMI 30-39.9) 11/07/2014     Priority: Medium   • HTN (hypertension) 11/14/2013     Priority: Medium     Class: Chronic   • Insomnia      Priority: Medium   • Varicose vein of leg 02/19/2016   • Sprain of sacroiliac ligament 08/24/2015   • History of colonic polyps 08/16/2015   • Health care maintenance 09/25/2014   • Palpitations 09/25/2014   • Cerumen debris on tympanic membrane of both ears 09/25/2014   • Hx of total hip arthroplasty 09/26/2012   • High cholesterol        Allergies:Nkda [no known drug allergy]    Current Outpatient Prescriptions   Medication Sig Dispense Refill   • Omega-3 Fatty Acids (FISH OIL) 1000 MG Cap capsule Take 1,000 mg by mouth 3 times a day, with meals.     • MELATONIN PO Take  by mouth.     • fluticasone (FLONASE) 50 MCG/ACT nasal spray Spray 2 Sprays in nose every day. 3 Bottle 3   • diltiazem CD (CARDIZEM CD)  120 MG CAPSULE SR 24 HR TAKE ONE CAPSULE BY MOUTH ONE TIME DAILY 90 Cap 3   • celecoxib (CELEBREX) 100 MG Cap Take 1 Cap by mouth every day. 90 Cap 3   • pravastatin (PRAVACHOL) 20 MG Tab Take 1 Tab by mouth every day. TAKE 1 TAB BY MOUTH EVERY DAY. 90 Tab 3   • cholecalciferol (VITAMIN D3) 5000 UNIT Cap Take 1 Cap by mouth every day. 90 Cap 3   • zolpidem (AMBIEN) 10 MG Tab Take 1 Tab by mouth at bedtime as needed. 30 Each 5     No current facility-administered medications for this visit.          Past Medical History:   Diagnosis Date   • Anesthesia     PONV   • Cerumen debris on tympanic membrane of both ears 2014    intermittent problem, ok for now, monitor   • High cholesterol    • Insomnia    • Obesity (BMI 30-39.9) 2014   • Pain     hip   • Varicose vein of leg 2016   • Vitamin D deficiency 2015       Social History   Substance Use Topics   • Smoking status: Never Smoker   • Smokeless tobacco: Never Used   • Alcohol use No      Comment: rare       Family Status   Relation Status   • Mother Alive   • Father  at age 73    Stoke   • Brother Alive    4 stents   • Paternal Uncle     MI   • Paternal Grandmother     MI   • Brother Alive   • Son Alive    Pericarditis at age 24   •       Family History   Problem Relation Age of Onset   • Hypertension Mother    • Hyperlipidemia Mother    • Heart Disease Father    • Hypertension Father    • Hyperlipidemia Father    • Stroke Father    • Heart Disease Brother    • Hypertension Brother    • Hypertension Brother    • Heart Disease     • Diabetes     • Cancer     • Hypertension         ROS:    Respiratory: Negative for cough, sputum production, shortness of breath or wheezing.    Cardiovascular: Negative for chest pain, palpitations, orthopnea, dyspnea with exertion or edema.   Gastrointestinal: Negative for GI upset, nausea, vomiting, abdominal pain, constipation or diarrhea.   Genitourinary: Negative for dysuria, urgency,  "hesitancy or frequency.       Exam:    Blood pressure 124/80, pulse 69, temperature 36.7 °C (98.1 °F), resp. rate 16, height 1.676 m (5' 6\"), weight 94.3 kg (208 lb), SpO2 97 %.  General:  Well nourished, well developed female in NAD.  HENT: Normocephalic, bilateral TMs are intact, nasal and oral mucosa with no lesions,   Neck: Supple without bruit. Thyroid is not enlarged.  Pulmonary: Clear to ausculation and percussion.  Normal effort. No rales, rhonchi, or wheezing.  Cardiovascular: Regular rate and rhythm without murmur.   Abdomen: Normal bowel sounds soft and nontender no palpable liver spleen bladder mass.  Extremities: No LE edema noted.  Neuro: Grossly nonfocal.  Psych: Alert and oriented to person, place, and time. Appropriate mood and conversation.    LABS: Results reviewed and discussed with the patient, questions answered.    This dictation was created using voice recognition software. I have made reasonable attempts to correct errors, however, errors of grammar and content may exist.          Assessment/Plan:    1. Essential hypertension  Stable on meds, discussed weight loss.    2. Vitamin D deficiency  Continues on supplementation lab assessment not done as she was therapeutic last draw.  Continue present management reviewed with patient    3. Obesity (BMI 30-39.9)  Discussed diet, exercise, weight loss, behavioral modification, portion size management.  Patient declines referral to behavioral health or dietitian  - Patient identified as having weight management issue.  Appropriate orders and counseling given.    4. High cholesterol  LDL of 108 on meds discussed diet exercise weight loss implications.    5. Primary insomnia  Discussed good sleep mechanics homeopathic options support monitor    6. History of total replacement of both hip joints  Some aching contributes to difficulty with sleep discussed regular exercise weight loss Celebrex monitoring.  She admits she has not been doing her nighttime " stretching and strengthening exercises.    7. Breast cancer screening  Due for breast cancer screening  - MA-SCREEN MAMMO W/CAD-BILAT; Future       Patient was seen for  25 minutes face to face of which more than 50% of the time was spent in counseling and coordination of care regarding the above problems.

## 2018-06-07 NOTE — LETTER
June 7, 2018         Patient: Magali Davidson   YOB: 1955   Date of Visit: 6/7/2018           To Whom it May Concern:    Magali Davidson was seen in my clinic on 6/7/2018.  An appropriate weight goal is #170.    If you have any questions or concerns, please don't hesitate to call.        Sincerely,           Ailyn HERNANDEZ M.D.  Electronically Signed

## 2018-06-07 NOTE — ASSESSMENT & PLAN NOTE
Patient has self discontinued the Ambien.  She reports she has trouble sleeping and then up and down due to the hip aching.  She has just started trial some melatonin.

## 2018-06-27 ENCOUNTER — TELEPHONE (OUTPATIENT)
Dept: MEDICAL GROUP | Facility: PHYSICIAN GROUP | Age: 63
End: 2018-06-27

## 2018-06-27 DIAGNOSIS — R92.8 ABNORMAL MAMMOGRAM OF LEFT BREAST: ICD-10-CM

## 2018-06-27 NOTE — TELEPHONE ENCOUNTER
1. Caller Name: Rachna at [facility]                      Call Back Number: [phone]    2. Message: Rachna called and stated that Nancys Mammo came back abnormal so she asked if you would be able to order a diagnostic left breast with Ultrasound if indicated.  We can fax the order to [phone].  Pts Mammo is scanned into media. Please advise     3. Patient approves office to leave a detailed voicemail/MyChart message: N\A

## 2018-07-12 ENCOUNTER — HOSPITAL ENCOUNTER (OUTPATIENT)
Dept: RADIOLOGY | Facility: MEDICAL CENTER | Age: 63
End: 2018-07-12

## 2018-07-19 ENCOUNTER — HOSPITAL ENCOUNTER (OUTPATIENT)
Dept: RADIOLOGY | Facility: MEDICAL CENTER | Age: 63
End: 2018-07-19
Attending: INTERNAL MEDICINE
Payer: COMMERCIAL

## 2018-07-19 DIAGNOSIS — R92.8 ABNORMAL MAMMOGRAM OF LEFT BREAST: ICD-10-CM

## 2018-07-19 PROCEDURE — G0279 TOMOSYNTHESIS, MAMMO: HCPCS | Mod: LT

## 2018-09-13 ENCOUNTER — OFFICE VISIT (OUTPATIENT)
Dept: MEDICAL GROUP | Facility: PHYSICIAN GROUP | Age: 63
End: 2018-09-13
Payer: COMMERCIAL

## 2018-09-13 VITALS
RESPIRATION RATE: 16 BRPM | HEART RATE: 70 BPM | WEIGHT: 202 LBS | OXYGEN SATURATION: 96 % | BODY MASS INDEX: 32.47 KG/M2 | TEMPERATURE: 98.6 F | SYSTOLIC BLOOD PRESSURE: 120 MMHG | DIASTOLIC BLOOD PRESSURE: 82 MMHG | HEIGHT: 66 IN

## 2018-09-13 DIAGNOSIS — R92.8 ABNORMAL MAMMOGRAM OF LEFT BREAST: ICD-10-CM

## 2018-09-13 DIAGNOSIS — F51.01 PRIMARY INSOMNIA: ICD-10-CM

## 2018-09-13 DIAGNOSIS — I10 ESSENTIAL HYPERTENSION: ICD-10-CM

## 2018-09-13 DIAGNOSIS — E66.9 OBESITY (BMI 30-39.9): ICD-10-CM

## 2018-09-13 DIAGNOSIS — E78.00 HIGH CHOLESTEROL: ICD-10-CM

## 2018-09-13 DIAGNOSIS — R92.8 ABNORMAL MAMMOGRAM: ICD-10-CM

## 2018-09-13 DIAGNOSIS — R00.2 PALPITATIONS: ICD-10-CM

## 2018-09-13 PROCEDURE — 99214 OFFICE O/P EST MOD 30 MIN: CPT | Performed by: INTERNAL MEDICINE

## 2018-09-13 RX ORDER — CELECOXIB 100 MG/1
100 CAPSULE ORAL DAILY
Qty: 90 CAP | Refills: 3 | Status: SHIPPED | OUTPATIENT
Start: 2018-09-13 | End: 2019-09-22 | Stop reason: SDUPTHER

## 2018-09-13 RX ORDER — ZOLPIDEM TARTRATE 5 MG/1
5 TABLET ORAL NIGHTLY PRN
Qty: 30 TAB | Refills: 5 | Status: SHIPPED | OUTPATIENT
Start: 2018-09-13 | End: 2018-10-13

## 2018-09-13 NOTE — ASSESSMENT & PLAN NOTE
Patient reports mammogram abnormality had been discussed with her by the radiologist at [facility].  She wonders if she should consider biopsy versus monitoring.  She cannot palpate a lump.  She does have a family history in her mom with breast cancer.

## 2018-09-13 NOTE — ASSESSMENT & PLAN NOTE
Patient reports she is still struggling getting to sleep,estimates 1 hour to fall asleep, awakens q 1 hour.  Would like to go back to the Ambien.

## 2018-09-13 NOTE — PROGRESS NOTES
Chief Complaint   Patient presents with   • Weight Loss     FV on weight        HISTORY OF PRESENT ILLNESS: Patient is a 63 y.o. female established patient who presents today to discuss the medical issues below.    Insomnia  Patient reports she is still struggling getting to sleep,estimates 1 hour to fall asleep, awakens q 1 hour.  Would like to go back to the Ambien.      HTN (hypertension)  Continues with cardizem, not following at home.  No chest pain palpitations or edema.      Obesity (BMI 30-39.9)  Continues to work on portions, some weight drop to  6#.    Palpitations  Patient denies significant palpitations since on the diltiazem.     Abnormal mammogram of left breast  Patient reports mammogram abnormality had been discussed with her by the radiologist at [facility].  She wonders if she should consider biopsy versus monitoring.  She cannot palpate a lump.  She does have a family history in her mom with breast cancer.      Patient Active Problem List    Diagnosis Date Noted   • Vitamin D deficiency 11/19/2015     Priority: Medium   • Obesity (BMI 30-39.9) 11/07/2014     Priority: Medium   • HTN (hypertension) 11/14/2013     Priority: Medium     Class: Chronic   • Insomnia      Priority: Medium   • Abnormal mammogram of left breast 09/13/2018   • Varicose vein of leg 02/19/2016   • Sprain of sacroiliac ligament 08/24/2015   • History of colonic polyps 08/16/2015   • Health care maintenance 09/25/2014   • Palpitations 09/25/2014   • Cerumen debris on tympanic membrane of both ears 09/25/2014   • Hx of total hip arthroplasty 09/26/2012   • High cholesterol        Allergies:Nkda [no known drug allergy]    Current Outpatient Prescriptions   Medication Sig Dispense Refill   • zolpidem (AMBIEN) 5 MG Tab Take 1 Tab by mouth at bedtime as needed for Sleep for up to 30 days. 30 Tab 5   • celecoxib (CELEBREX) 100 MG Cap Take 1 Cap by mouth every day. 90 Cap 3   • Omega-3 Fatty Acids (FISH OIL) 1000 MG Cap capsule Take  1,000 mg by mouth 3 times a day, with meals.     • MELATONIN PO Take  by mouth.     • fluticasone (FLONASE) 50 MCG/ACT nasal spray Spray 2 Sprays in nose every day. 3 Bottle 3   • diltiazem CD (CARDIZEM CD) 120 MG CAPSULE SR 24 HR TAKE ONE CAPSULE BY MOUTH ONE TIME DAILY 90 Cap 3   • pravastatin (PRAVACHOL) 20 MG Tab Take 1 Tab by mouth every day. TAKE 1 TAB BY MOUTH EVERY DAY. 90 Tab 3   • cholecalciferol (VITAMIN D3) 5000 UNIT Cap Take 1 Cap by mouth every day. 90 Cap 3   • zolpidem (AMBIEN) 10 MG Tab Take 1 Tab by mouth at bedtime as needed. 30 Each 5     No current facility-administered medications for this visit.          Past Medical History:   Diagnosis Date   • Anesthesia     PONV   • Cerumen debris on tympanic membrane of both ears 2014    intermittent problem, ok for now, monitor   • High cholesterol    • Insomnia    • Obesity (BMI 30-39.9) 2014   • Pain     hip   • Varicose vein of leg 2016   • Vitamin D deficiency 2015       Social History   Substance Use Topics   • Smoking status: Never Smoker   • Smokeless tobacco: Never Used   • Alcohol use No      Comment: rare       Family Status   Relation Status   • Mo Alive   • Fa  at age 73        Stoke   • Bro Alive        4 stents   • PUnc         MI   • PGMo         MI   • Bro Alive   • Son Alive        Pericarditis at age 24   • Unknown (Not Specified)     Family History   Problem Relation Age of Onset   • Hypertension Mother    • Hyperlipidemia Mother    • Heart Disease Father    • Hypertension Father    • Hyperlipidemia Father    • Stroke Father    • Heart Disease Brother    • Hypertension Brother    • Hypertension Brother    • Heart Disease Unknown    • Diabetes Unknown    • Cancer Unknown    • Hypertension Unknown        ROS:    Respiratory: Negative for cough, sputum production, shortness of breath or wheezing.    Cardiovascular: Negative for chest pain, palpitations, orthopnea, dyspnea with exertion or  "edema.   Gastrointestinal: Negative for GI upset, nausea, vomiting, abdominal pain, constipation or diarrhea.   Genitourinary: Negative for dysuria, urgency, hesitancy or frequency.       Exam:    Blood pressure 120/82, pulse 70, temperature 37 °C (98.6 °F), resp. rate 16, height 1.676 m (5' 6\"), weight 91.6 kg (202 lb), SpO2 96 %.  General:  Well nourished, well developed female in NAD.  Pulmonary: Clear to ausculation and percussion.  Normal effort. No rales, rhonchi, or wheezing.  Cardiovascular: Regular rate and rhythm without murmur.   Abdomen: Normal bowel sounds soft and nontender no palpable liver spleen bladder mass.  Extremities: No LE edema noted.  Neuro: Grossly nonfocal.  Psych: Alert and oriented to person, place, and time. Appropriate mood and conversation.    Reviewed mammogram    This dictation was created using voice recognition software. I have made reasonable attempts to correct errors, however, errors of grammar and content may exist.          Assessment/Plan:    1. Primary insomnia  Discussion held regarding behavior modification, self scripting support monitor as needed Ambien.  - zolpidem (AMBIEN) 5 MG Tab; Take 1 Tab by mouth at bedtime as needed for Sleep for up to 30 days.  Dispense: 30 Tab; Refill: 5    2. Abnormal mammogram  Reviewed mammogram and recommendations.  Patient ultimately opts for conservative management 6 month repeat mammogram.  - MA-UNILATERAL DIAGNOSTIC (CB)-MG LEFT; Future    3. Essential hypertension  Well-controlled on meds continue lab monitoring  - COMP METABOLIC PANEL; Future  - CBC WITH DIFFERENTIAL; Future    4. Obesity (BMI 30-39.9)  Reviewed diet exercise weight loss    5. Palpitations  Rare well-controlled on Cardizem    6. High cholesterol  Continuing on Pravachol lab and liver monitoring discussed  - LIPID PROFILE; Future    7. Abnormal mammogram of left breast  As above    Patient was seen for 25 minutes face to face of which more than 50% of the time was " spent in counseling and coordination of care regarding the above problems.

## 2018-12-20 DIAGNOSIS — I10 ESSENTIAL HYPERTENSION: ICD-10-CM

## 2018-12-20 DIAGNOSIS — R00.2 PALPITATIONS: ICD-10-CM

## 2018-12-21 RX ORDER — DILTIAZEM HYDROCHLORIDE 120 MG/1
CAPSULE, EXTENDED RELEASE ORAL
Qty: 90 CAP | Refills: 3 | Status: SHIPPED | OUTPATIENT
Start: 2018-12-21 | End: 2019-12-02 | Stop reason: SDUPTHER

## 2018-12-21 NOTE — TELEPHONE ENCOUNTER
Was the patient seen in the last year in this department? Yes    Does patient have an active prescription for medications requested? No     Received Request Via: Pharmacy     Last OV 9/13/18  Labs 5/24/18

## 2019-01-21 ENCOUNTER — HOSPITAL ENCOUNTER (OUTPATIENT)
Dept: RADIOLOGY | Facility: MEDICAL CENTER | Age: 64
End: 2019-01-21
Attending: INTERNAL MEDICINE
Payer: COMMERCIAL

## 2019-01-21 DIAGNOSIS — R92.8 ABNORMAL MAMMOGRAM: ICD-10-CM

## 2019-01-21 PROCEDURE — G0279 TOMOSYNTHESIS, MAMMO: HCPCS | Mod: LT

## 2019-01-29 RX ORDER — PRAVASTATIN SODIUM 20 MG
TABLET ORAL
Qty: 90 TAB | Refills: 2 | Status: SHIPPED | OUTPATIENT
Start: 2019-01-29 | End: 2019-11-02 | Stop reason: SDUPTHER

## 2019-01-29 NOTE — TELEPHONE ENCOUNTER
Was the patient seen in the last year in this department? Yes    Does patient have an active prescription for medications requested? No     Received Request Via: Pharmacy     Last OV 9/13/18, last labs 5/24/18

## 2019-03-07 ENCOUNTER — HOSPITAL ENCOUNTER (OUTPATIENT)
Dept: LAB | Facility: MEDICAL CENTER | Age: 64
End: 2019-03-07
Attending: INTERNAL MEDICINE
Payer: COMMERCIAL

## 2019-03-07 DIAGNOSIS — I10 ESSENTIAL HYPERTENSION: ICD-10-CM

## 2019-03-07 DIAGNOSIS — E78.00 HIGH CHOLESTEROL: ICD-10-CM

## 2019-03-07 LAB
ALBUMIN SERPL BCP-MCNC: 4.1 G/DL (ref 3.2–4.9)
ALBUMIN/GLOB SERPL: 1.5 G/DL
ALP SERPL-CCNC: 81 U/L (ref 30–99)
ALT SERPL-CCNC: 13 U/L (ref 2–50)
ANION GAP SERPL CALC-SCNC: 8 MMOL/L (ref 0–11.9)
AST SERPL-CCNC: 19 U/L (ref 12–45)
BASOPHILS # BLD AUTO: 0.7 % (ref 0–1.8)
BASOPHILS # BLD: 0.04 K/UL (ref 0–0.12)
BILIRUB SERPL-MCNC: 1.8 MG/DL (ref 0.1–1.5)
BUN SERPL-MCNC: 13 MG/DL (ref 8–22)
CALCIUM SERPL-MCNC: 9.4 MG/DL (ref 8.5–10.5)
CHLORIDE SERPL-SCNC: 105 MMOL/L (ref 96–112)
CHOLEST SERPL-MCNC: 183 MG/DL (ref 100–199)
CO2 SERPL-SCNC: 27 MMOL/L (ref 20–33)
CREAT SERPL-MCNC: 0.63 MG/DL (ref 0.5–1.4)
EOSINOPHIL # BLD AUTO: 0.1 K/UL (ref 0–0.51)
EOSINOPHIL NFR BLD: 1.9 % (ref 0–6.9)
ERYTHROCYTE [DISTWIDTH] IN BLOOD BY AUTOMATED COUNT: 43 FL (ref 35.9–50)
FASTING STATUS PATIENT QL REPORTED: NORMAL
GLOBULIN SER CALC-MCNC: 2.7 G/DL (ref 1.9–3.5)
GLUCOSE SERPL-MCNC: 84 MG/DL (ref 65–99)
HCT VFR BLD AUTO: 43.7 % (ref 37–47)
HDLC SERPL-MCNC: 68 MG/DL
HGB BLD-MCNC: 14.4 G/DL (ref 12–16)
IMM GRANULOCYTES # BLD AUTO: 0 K/UL (ref 0–0.11)
IMM GRANULOCYTES NFR BLD AUTO: 0 % (ref 0–0.9)
LDLC SERPL CALC-MCNC: 95 MG/DL
LYMPHOCYTES # BLD AUTO: 2.38 K/UL (ref 1–4.8)
LYMPHOCYTES NFR BLD: 44.1 % (ref 22–41)
MCH RBC QN AUTO: 31.4 PG (ref 27–33)
MCHC RBC AUTO-ENTMCNC: 33 G/DL (ref 33.6–35)
MCV RBC AUTO: 95.2 FL (ref 81.4–97.8)
MONOCYTES # BLD AUTO: 0.46 K/UL (ref 0–0.85)
MONOCYTES NFR BLD AUTO: 8.5 % (ref 0–13.4)
NEUTROPHILS # BLD AUTO: 2.42 K/UL (ref 2–7.15)
NEUTROPHILS NFR BLD: 44.8 % (ref 44–72)
NRBC # BLD AUTO: 0 K/UL
NRBC BLD-RTO: 0 /100 WBC
PLATELET # BLD AUTO: 193 K/UL (ref 164–446)
PMV BLD AUTO: 12.3 FL (ref 9–12.9)
POTASSIUM SERPL-SCNC: 4.3 MMOL/L (ref 3.6–5.5)
PROT SERPL-MCNC: 6.8 G/DL (ref 6–8.2)
RBC # BLD AUTO: 4.59 M/UL (ref 4.2–5.4)
SODIUM SERPL-SCNC: 140 MMOL/L (ref 135–145)
TRIGL SERPL-MCNC: 101 MG/DL (ref 0–149)
WBC # BLD AUTO: 5.4 K/UL (ref 4.8–10.8)

## 2019-03-07 PROCEDURE — 85025 COMPLETE CBC W/AUTO DIFF WBC: CPT

## 2019-03-07 PROCEDURE — 80061 LIPID PANEL: CPT

## 2019-03-07 PROCEDURE — 36415 COLL VENOUS BLD VENIPUNCTURE: CPT

## 2019-03-07 PROCEDURE — 80053 COMPREHEN METABOLIC PANEL: CPT

## 2019-03-14 ENCOUNTER — OFFICE VISIT (OUTPATIENT)
Dept: MEDICAL GROUP | Facility: PHYSICIAN GROUP | Age: 64
End: 2019-03-14
Payer: COMMERCIAL

## 2019-03-14 VITALS
RESPIRATION RATE: 16 BRPM | TEMPERATURE: 98.5 F | SYSTOLIC BLOOD PRESSURE: 110 MMHG | HEART RATE: 69 BPM | DIASTOLIC BLOOD PRESSURE: 76 MMHG | OXYGEN SATURATION: 97 % | BODY MASS INDEX: 33.27 KG/M2 | WEIGHT: 207 LBS | HEIGHT: 66 IN

## 2019-03-14 DIAGNOSIS — I10 ESSENTIAL HYPERTENSION: ICD-10-CM

## 2019-03-14 DIAGNOSIS — E55.9 VITAMIN D DEFICIENCY: ICD-10-CM

## 2019-03-14 DIAGNOSIS — Z12.39 BREAST CANCER SCREENING: ICD-10-CM

## 2019-03-14 DIAGNOSIS — G47.00 INSOMNIA, UNSPECIFIED TYPE: ICD-10-CM

## 2019-03-14 DIAGNOSIS — R92.8 ABNORMAL MAMMOGRAM OF LEFT BREAST: ICD-10-CM

## 2019-03-14 DIAGNOSIS — E66.9 OBESITY (BMI 30-39.9): ICD-10-CM

## 2019-03-14 DIAGNOSIS — E78.00 HIGH CHOLESTEROL: ICD-10-CM

## 2019-03-14 PROCEDURE — 99214 OFFICE O/P EST MOD 30 MIN: CPT | Performed by: INTERNAL MEDICINE

## 2019-03-14 RX ORDER — ZOLPIDEM TARTRATE 10 MG/1
10 TABLET ORAL NIGHTLY PRN
Qty: 30 EACH | Refills: 5 | Status: SHIPPED | OUTPATIENT
Start: 2019-03-14 | End: 2019-09-17 | Stop reason: SDUPTHER

## 2019-03-14 NOTE — PROGRESS NOTES
Chief Complaint   Patient presents with   • Vitamin D Deficiency     lab results    • Medication Refill     Ambien        HISTORY OF PRESENT ILLNESS: Patient is a 64 y.o. female established patient who presents today to discuss the medical issues below.    HTN (hypertension)  Not following at home, continues on meds, no chest pain palpitations or edema.      Insomnia  Currently using about 15 tabs a month. Not following exercise program.  Denies extra stress     Obesity (BMI 30-39.9)  Continues to struggle with diet.  Not exercising, weight up and down.      Abnormal mammogram of left breast  Follow up exam indicating stability and 6 mos return to annual mammogram recommended.        Patient Active Problem List    Diagnosis Date Noted   • Vitamin D deficiency 11/19/2015     Priority: Medium   • Obesity (BMI 30-39.9) 11/07/2014     Priority: Medium   • HTN (hypertension) 11/14/2013     Priority: Medium     Class: Chronic   • Insomnia      Priority: Medium   • Abnormal mammogram of left breast 09/13/2018   • Varicose vein of leg 02/19/2016   • Sprain of sacroiliac ligament 08/24/2015   • History of colonic polyps 08/16/2015   • Health care maintenance 09/25/2014   • Palpitations 09/25/2014   • Cerumen debris on tympanic membrane of both ears 09/25/2014   • Hx of total hip arthroplasty 09/26/2012   • High cholesterol        Allergies:Nkda [no known drug allergy]    Current Outpatient Prescriptions   Medication Sig Dispense Refill   • zolpidem (AMBIEN) 10 MG Tab Take 1 Tab by mouth at bedtime as needed for up to 30 days. 30 Each 5   • pravastatin (PRAVACHOL) 20 MG Tab Take one tablet by mouth one time daily 90 Tab 2   • CARTIA  MG CAPSULE SR 24 HR Take one capsule by mouth one time daily 90 Cap 3   • celecoxib (CELEBREX) 100 MG Cap Take 1 Cap by mouth every day. 90 Cap 3   • Omega-3 Fatty Acids (FISH OIL) 1000 MG Cap capsule Take 1,000 mg by mouth 3 times a day, with meals.     • cholecalciferol (VITAMIN D3)  "5000 UNIT Cap Take 1 Cap by mouth every day. 90 Cap 3   • MELATONIN PO Take  by mouth.     • fluticasone (FLONASE) 50 MCG/ACT nasal spray Spray 2 Sprays in nose every day. (Patient not taking: Reported on 3/14/2019) 3 Bottle 3     No current facility-administered medications for this visit.          Past Medical History:   Diagnosis Date   • Anesthesia     PONV   • Cerumen debris on tympanic membrane of both ears 2014    intermittent problem, ok for now, monitor   • High cholesterol    • Insomnia    • Obesity (BMI 30-39.9) 2014   • Pain     hip   • Varicose vein of leg 2016   • Vitamin D deficiency 2015       Social History   Substance Use Topics   • Smoking status: Never Smoker   • Smokeless tobacco: Never Used   • Alcohol use No      Comment: rare       Family Status   Relation Status   • Mo Alive   • Fa  at age 73        Stoke   • Bro Alive        4 stents   • PUnc         MI   • PGMo         MI   • Bro Alive   • Son Alive        Pericarditis at age 24   • Unknown (Not Specified)     Family History   Problem Relation Age of Onset   • Hypertension Mother    • Hyperlipidemia Mother    • Heart Disease Father    • Hypertension Father    • Hyperlipidemia Father    • Stroke Father    • Heart Disease Brother    • Hypertension Brother    • Hypertension Brother    • Heart Disease Unknown    • Diabetes Unknown    • Cancer Unknown    • Hypertension Unknown        ROS:    Respiratory: Negative for cough, sputum production, shortness of breath or wheezing.    Cardiovascular: Negative for chest pain, palpitations, orthopnea, dyspnea with exertion or edema.   Gastrointestinal: Negative for GI upset, nausea, vomiting, abdominal pain, constipation or diarrhea.   Genitourinary: Negative for dysuria, urgency, hesitancy or frequency.       Exam:    Blood pressure 110/76, pulse 69, temperature 36.9 °C (98.5 °F), temperature source Temporal, resp. rate 16, height 1.676 m (5' 6\"), weight " 93.9 kg (207 lb), SpO2 97 %, not currently breastfeeding.  General:  Well nourished, well developed female in NAD.  HENT: Normocephalic, bilateral TMs are intact, nasal and oral mucosa with no lesions,   Neck: Supple without bruit. Thyroid is not enlarged.  Pulmonary: Clear to ausculation and percussion.  Normal effort. No rales, rhonchi, or wheezing.  Cardiovascular: Regular rate and rhythm without murmur.   Abdomen: Normal bowel sounds soft and nontender no palpable liver spleen bladder mass.  Extremities: No LE edema noted.  Neuro: Grossly nonfocal.  Psych: Alert and oriented to person, place, and time. Appropriate mood and conversation.        This dictation was created using voice recognition software. I have made reasonable attempts to correct errors, however, errors of grammar and content may exist.          Assessment/Plan:    1. Breast cancer screening  Order for annual monitoring, reviewed last mammogram  - [id number]; Future    2. Essential hypertension  Stable on meds, lab monitoring recommendations discussed, weight implication reviewed.    - Comp Metabolic Panel; Future  - CBC WITH DIFFERENTIAL; Future    3. Obesity (BMI 30-39.9)  Discussed diet, exercise, weight loss, behavioral modification, portion size management.    4. Vitamin D deficiency  On supplement.     5. High cholesterol  Continues on statin as Pravachol, lab monitoring reviewed.  - Lipid Profile; Future    6. Insomnia, unspecified type  Sleep mechanics reviewed, no evidence of adverse reaction or abuse.  Refills written  - zolpidem (AMBIEN) 10 MG Tab; Take 1 Tab by mouth at bedtime as needed for up to 30 days.  Dispense: 30 Each; Refill: 5    7. Abnormal mammogram of left breast  As above return to annual screening in 6 months order written       Patient was seen for 25 minutes face to face of which more than 50% of the time was spent in counseling and coordination of care regarding the above problems.

## 2019-07-22 ENCOUNTER — HOSPITAL ENCOUNTER (OUTPATIENT)
Dept: RADIOLOGY | Facility: MEDICAL CENTER | Age: 64
End: 2019-07-22
Attending: INTERNAL MEDICINE
Payer: COMMERCIAL

## 2019-07-22 DIAGNOSIS — R92.8 ABNORMAL MAMMOGRAM: ICD-10-CM

## 2019-07-22 PROCEDURE — G0279 TOMOSYNTHESIS, MAMMO: HCPCS

## 2019-07-23 ENCOUNTER — TELEPHONE (OUTPATIENT)
Dept: MEDICAL GROUP | Facility: PHYSICIAN GROUP | Age: 64
End: 2019-07-23

## 2019-07-23 NOTE — TELEPHONE ENCOUNTER
DOCUMENTATION OF PAR STATUS:    1. Name of Medication & Dose: Zolpidem 10mg     2. Name of Prescription Coverage Company & phone #: [employer]    3. Date Prior Auth Submitted: 7/23/19    4. What information was given to obtain insurance decision? Scanned into media     5. Prior Auth Status? Pending    6. Patient Notified: no

## 2019-09-12 ENCOUNTER — HOSPITAL ENCOUNTER (OUTPATIENT)
Dept: LAB | Facility: MEDICAL CENTER | Age: 64
End: 2019-09-12
Attending: INTERNAL MEDICINE
Payer: COMMERCIAL

## 2019-09-12 DIAGNOSIS — I10 ESSENTIAL HYPERTENSION: ICD-10-CM

## 2019-09-12 DIAGNOSIS — E78.00 HIGH CHOLESTEROL: ICD-10-CM

## 2019-09-12 LAB
ALBUMIN SERPL BCP-MCNC: 4 G/DL (ref 3.2–4.9)
ALBUMIN/GLOB SERPL: 1.4 G/DL
ALP SERPL-CCNC: 82 U/L (ref 30–99)
ALT SERPL-CCNC: 9 U/L (ref 2–50)
ANION GAP SERPL CALC-SCNC: 8 MMOL/L (ref 0–11.9)
AST SERPL-CCNC: 16 U/L (ref 12–45)
BASOPHILS # BLD AUTO: 1 % (ref 0–1.8)
BASOPHILS # BLD: 0.05 K/UL (ref 0–0.12)
BILIRUB SERPL-MCNC: 1.3 MG/DL (ref 0.1–1.5)
BUN SERPL-MCNC: 14 MG/DL (ref 8–22)
CALCIUM SERPL-MCNC: 9.2 MG/DL (ref 8.5–10.5)
CHLORIDE SERPL-SCNC: 104 MMOL/L (ref 96–112)
CHOLEST SERPL-MCNC: 175 MG/DL (ref 100–199)
CO2 SERPL-SCNC: 29 MMOL/L (ref 20–33)
CREAT SERPL-MCNC: 0.71 MG/DL (ref 0.5–1.4)
EOSINOPHIL # BLD AUTO: 0.08 K/UL (ref 0–0.51)
EOSINOPHIL NFR BLD: 1.6 % (ref 0–6.9)
ERYTHROCYTE [DISTWIDTH] IN BLOOD BY AUTOMATED COUNT: 45.8 FL (ref 35.9–50)
FASTING STATUS PATIENT QL REPORTED: NORMAL
GLOBULIN SER CALC-MCNC: 2.9 G/DL (ref 1.9–3.5)
GLUCOSE SERPL-MCNC: 95 MG/DL (ref 65–99)
HCT VFR BLD AUTO: 43.3 % (ref 37–47)
HDLC SERPL-MCNC: 68 MG/DL
HGB BLD-MCNC: 14.1 G/DL (ref 12–16)
IMM GRANULOCYTES # BLD AUTO: 0.01 K/UL (ref 0–0.11)
IMM GRANULOCYTES NFR BLD AUTO: 0.2 % (ref 0–0.9)
LDLC SERPL CALC-MCNC: 90 MG/DL
LYMPHOCYTES # BLD AUTO: 2.05 K/UL (ref 1–4.8)
LYMPHOCYTES NFR BLD: 39.9 % (ref 22–41)
MCH RBC QN AUTO: 31.3 PG (ref 27–33)
MCHC RBC AUTO-ENTMCNC: 32.6 G/DL (ref 33.6–35)
MCV RBC AUTO: 96 FL (ref 81.4–97.8)
MONOCYTES # BLD AUTO: 0.4 K/UL (ref 0–0.85)
MONOCYTES NFR BLD AUTO: 7.8 % (ref 0–13.4)
NEUTROPHILS # BLD AUTO: 2.55 K/UL (ref 2–7.15)
NEUTROPHILS NFR BLD: 49.5 % (ref 44–72)
NRBC # BLD AUTO: 0 K/UL
NRBC BLD-RTO: 0 /100 WBC
PLATELET # BLD AUTO: 190 K/UL (ref 164–446)
PMV BLD AUTO: 12.5 FL (ref 9–12.9)
POTASSIUM SERPL-SCNC: 4.2 MMOL/L (ref 3.6–5.5)
PROT SERPL-MCNC: 6.9 G/DL (ref 6–8.2)
RBC # BLD AUTO: 4.51 M/UL (ref 4.2–5.4)
SODIUM SERPL-SCNC: 141 MMOL/L (ref 135–145)
TRIGL SERPL-MCNC: 83 MG/DL (ref 0–149)
WBC # BLD AUTO: 5.1 K/UL (ref 4.8–10.8)

## 2019-09-12 PROCEDURE — 36415 COLL VENOUS BLD VENIPUNCTURE: CPT

## 2019-09-12 PROCEDURE — 85025 COMPLETE CBC W/AUTO DIFF WBC: CPT

## 2019-09-12 PROCEDURE — 80061 LIPID PANEL: CPT

## 2019-09-12 PROCEDURE — 80053 COMPREHEN METABOLIC PANEL: CPT

## 2019-09-17 ENCOUNTER — OFFICE VISIT (OUTPATIENT)
Dept: MEDICAL GROUP | Facility: PHYSICIAN GROUP | Age: 64
End: 2019-09-17
Payer: COMMERCIAL

## 2019-09-17 VITALS
HEART RATE: 80 BPM | TEMPERATURE: 98.5 F | HEIGHT: 66 IN | BODY MASS INDEX: 32.95 KG/M2 | RESPIRATION RATE: 16 BRPM | SYSTOLIC BLOOD PRESSURE: 122 MMHG | DIASTOLIC BLOOD PRESSURE: 90 MMHG | WEIGHT: 205 LBS | OXYGEN SATURATION: 96 %

## 2019-09-17 DIAGNOSIS — Z12.39 SCREENING FOR BREAST CANCER: ICD-10-CM

## 2019-09-17 DIAGNOSIS — E66.9 OBESITY (BMI 30-39.9): ICD-10-CM

## 2019-09-17 DIAGNOSIS — I10 ESSENTIAL HYPERTENSION: ICD-10-CM

## 2019-09-17 DIAGNOSIS — G47.00 INSOMNIA, UNSPECIFIED TYPE: ICD-10-CM

## 2019-09-17 DIAGNOSIS — R92.8 ABNORMAL MAMMOGRAM OF LEFT BREAST: ICD-10-CM

## 2019-09-17 DIAGNOSIS — H61.23 CERUMEN DEBRIS ON TYMPANIC MEMBRANE OF BOTH EARS: ICD-10-CM

## 2019-09-17 PROCEDURE — 99214 OFFICE O/P EST MOD 30 MIN: CPT | Performed by: INTERNAL MEDICINE

## 2019-09-17 RX ORDER — ZOLPIDEM TARTRATE 10 MG/1
10 TABLET ORAL NIGHTLY PRN
Qty: 30 EACH | Refills: 5 | Status: SHIPPED | OUTPATIENT
Start: 2019-09-17 | End: 2020-06-23 | Stop reason: SDUPTHER

## 2019-09-17 NOTE — PROGRESS NOTES
Chief Complaint   Patient presents with   • Labs Only     lab results    • Ear Fullness     pt feels that ears need cleaned    • Medication Refill     Ambien    • Orders Needed     Pt needs Diagnostic Mammo for a 6 month check        HISTORY OF PRESENT ILLNESS: Patient is a 64 y.o. female established patient who presents today to discuss the medical issues below.    Insomnia  Continues at baseline estimates about q OD on ambien.  Unable to identify what is causing the multiple awakens at night.  Denies stress,  does leave TV on all night in the other room.      HTN (hypertension)  Continues on meds, not following at home.     Cerumen debris on tympanic membrane of both ears  Patient again having trouble with her ears bilaterally.      Obesity (BMI 30-39.9)  Trying to follow healthy diet, is exercising regullarly.      Abnormal mammogram of left breast  Not noting any change to fullness of the breast, due for surveillance mammogram in Dec/[place]      Patient Active Problem List    Diagnosis Date Noted   • Vitamin D deficiency 11/19/2015     Priority: Medium   • Obesity (BMI 30-39.9) 11/07/2014     Priority: Medium   • HTN (hypertension) 11/14/2013     Priority: Medium     Class: Chronic   • Insomnia      Priority: Medium   • Abnormal mammogram of left breast 09/13/2018   • Varicose vein of leg 02/19/2016   • Sprain of sacroiliac ligament 08/24/2015   • History of colonic polyps 08/16/2015   • Health care maintenance 09/25/2014   • Palpitations 09/25/2014   • Cerumen debris on tympanic membrane of both ears 09/25/2014   • Hx of total hip arthroplasty 09/26/2012   • High cholesterol        Allergies:Nkda [no known drug allergy]    Current Outpatient Medications   Medication Sig Dispense Refill   • zolpidem (AMBIEN) 10 MG Tab Take 1 Tab by mouth at bedtime as needed for up to 30 days. 30 Each 5   • pravastatin (PRAVACHOL) 20 MG Tab Take one tablet by mouth one time daily 90 Tab 2   • CARTIA  MG CAPSULE SR 24  HR Take one capsule by mouth one time daily 90 Cap 3   • celecoxib (CELEBREX) 100 MG Cap Take 1 Cap by mouth every day. 90 Cap 3   • cholecalciferol (VITAMIN D3) 5000 UNIT Cap Take 1 Cap by mouth every day. 90 Cap 3   • Omega-3 Fatty Acids (FISH OIL) 1000 MG Cap capsule Take 1,000 mg by mouth 3 times a day, with meals.     • MELATONIN PO Take  by mouth.     • fluticasone (FLONASE) 50 MCG/ACT nasal spray Spray 2 Sprays in nose every day. (Patient not taking: Reported on 3/14/2019) 3 Bottle 3     No current facility-administered medications for this visit.          Past Medical History:   Diagnosis Date   • Anesthesia     PONV   • Cerumen debris on tympanic membrane of both ears 2014    intermittent problem, ok for now, monitor   • High cholesterol    • Insomnia    • Obesity (BMI 30-39.9) 2014   • Pain     hip   • Varicose vein of leg 2016   • Vitamin D deficiency 2015       Social History     Tobacco Use   • Smoking status: Never Smoker   • Smokeless tobacco: Never Used   Substance Use Topics   • Alcohol use: No     Alcohol/week: 0.0 oz     Comment: rare   • Drug use: No       Family Status   Relation Name Status   • Mo  Alive   • Fa   at age 73        Stoke   • Bro  Alive        4 stents   • PUnc          MI   • PGMo          MI   • Bro  Alive   • Son  Alive        Pericarditis at age 24   • OTHER  (Not Specified)     Family History   Problem Relation Age of Onset   • Hypertension Mother    • Hyperlipidemia Mother    • Heart Disease Father    • Hypertension Father    • Hyperlipidemia Father    • Stroke Father    • Heart Disease Brother    • Hypertension Brother    • Hypertension Brother    • Heart Disease Other    • Diabetes Other    • Cancer Other    • Hypertension Other        ROS:    Respiratory: Negative for cough, sputum production, shortness of breath or wheezing.    Cardiovascular: Negative for chest pain, palpitations, orthopnea, dyspnea with exertion or edema.  "  Gastrointestinal: Negative for GI upset, nausea, vomiting, abdominal pain, constipation or diarrhea.   Genitourinary: Negative for dysuria, urgency, hesitancy or frequency.       Exam:    /90 (BP Location: Left arm, Patient Position: Sitting, BP Cuff Size: Large adult)   Pulse 80   Temp 36.9 °C (98.5 °F) (Temporal)   Resp 16   Ht 1.676 m (5' 6\")   Wt 93 kg (205 lb)   SpO2 96%   General:  Well nourished, well developed female in NAD.  HENT: Normocephalic, bilateral TMs are occluded with cerumen, no tenderness, nasal mucosa with some edema, no tenderness  Neck: Supple without bruit. Thyroid is not enlarged.  Pulmonary: Clear to ausculation and percussion.  Normal effort. No rales, rhonchi, or wheezing.  Cardiovascular: Regular rate and rhythm without murmur.   Abdomen: Normal bowel sounds soft and nontender no palpable liver spleen bladder mass.  Extremities: No LE edema noted.  Neuro: Grossly nonfocal.  Psych: Alert and oriented to person, place, and time. Appropriate mood and conversation.    LABS: Results reviewed and discussed with the patient, questions answered.    This dictation was created using voice recognition software. I have made reasonable attempts to correct errors, however, errors of grammar and content may exist.          Assessment/Plan:    1. Abnormal mammogram of left breast  Order for diagnostic surveillance on left breast lesion  - MA DIAGNOSTIC MAMMO LEFT W/CAD; Future    2. Obesity (BMI 30-39.9)  Portion control emphasis  - Patient identified as having weight management issue.  Appropriate orders and counseling given.      3. Insomnia, unspecified type  Trial earplugs, rx written, no evidence of abouse  - zolpidem (AMBIEN) 10 MG Tab; Take 1 Tab by mouth at bedtime as needed for up to 30 days.  Dispense: 30 Each; Refill: 5    4. Essential hypertension  Stable on meds.    5. Cerumen debris on tympanic membrane of both ears  - Ear Cerumen Removal    Patient was seen for 25 minutes " face to face of which more than 50% of the time was spent in counseling and coordination of care regarding the above problems.

## 2019-09-17 NOTE — ASSESSMENT & PLAN NOTE
Continues at baseline estimates about q OD on ambien.  Unable to identify what is causing the multiple awakens at night.  Denies stress,  does leave TV on all night in the other room.

## 2019-09-23 NOTE — TELEPHONE ENCOUNTER
Was the patient seen in the last year in this department? Yes    Does patient have an active prescription for medications requested? No     Received Request Via: Pharmacy     Last Ov 9/17/19, last labs 9/12/19

## 2019-09-24 RX ORDER — CELECOXIB 100 MG/1
CAPSULE ORAL
Qty: 90 CAP | Refills: 3 | Status: SHIPPED | OUTPATIENT
Start: 2019-09-24 | End: 2020-09-02 | Stop reason: SDUPTHER

## 2019-11-05 RX ORDER — PRAVASTATIN SODIUM 20 MG
TABLET ORAL
Qty: 90 TAB | Refills: 2 | Status: SHIPPED | OUTPATIENT
Start: 2019-11-05 | End: 2020-08-12 | Stop reason: SDUPTHER

## 2019-11-05 NOTE — TELEPHONE ENCOUNTER
Pt has had OV within the 12 month protocol and lipid panel is current. 9 month supply sent to pharmacy.   Lab Results   Component Value Date/Time    CHOLSTRLTOT 175 09/12/2019 08:48 AM    LDL 90 09/12/2019 08:48 AM    HDL 68 09/12/2019 08:48 AM    TRIGLYCERIDE 83 09/12/2019 08:48 AM       Lab Results   Component Value Date/Time    SODIUM 141 09/12/2019 08:48 AM    POTASSIUM 4.2 09/12/2019 08:48 AM    CHLORIDE 104 09/12/2019 08:48 AM    CO2 29 09/12/2019 08:48 AM    GLUCOSE 95 09/12/2019 08:48 AM    BUN 14 09/12/2019 08:48 AM    CREATININE 0.71 09/12/2019 08:48 AM     Lab Results   Component Value Date/Time    ALKPHOSPHAT 82 09/12/2019 08:48 AM    ASTSGOT 16 09/12/2019 08:48 AM    ALTSGPT 9 09/12/2019 08:48 AM    TBILIRUBIN 1.3 09/12/2019 08:48 AM

## 2019-12-19 ENCOUNTER — TELEPHONE (OUTPATIENT)
Dept: MEDICAL GROUP | Facility: CLINIC | Age: 64
End: 2019-12-19

## 2019-12-19 NOTE — TELEPHONE ENCOUNTER
FINAL PRIOR AUTHORIZATION STATUS:    1.  Name of Medication & Dose: celecoxib (CELEBREX) 100 MG Cap      2. Prior Auth Status: Approved through 11/19/19 to 12/18/20     3. Action Taken: Pharmacy Notified: yes Patient Notified: yes

## 2020-01-23 ENCOUNTER — HOSPITAL ENCOUNTER (OUTPATIENT)
Dept: RADIOLOGY | Facility: MEDICAL CENTER | Age: 65
End: 2020-01-23
Attending: INTERNAL MEDICINE
Payer: COMMERCIAL

## 2020-01-23 DIAGNOSIS — R92.8 ABNORMAL MAMMOGRAM OF LEFT BREAST: ICD-10-CM

## 2020-01-23 PROCEDURE — G0279 TOMOSYNTHESIS, MAMMO: HCPCS | Mod: LT

## 2020-07-24 ENCOUNTER — HOSPITAL ENCOUNTER (OUTPATIENT)
Dept: RADIOLOGY | Facility: MEDICAL CENTER | Age: 65
End: 2020-07-24
Attending: INTERNAL MEDICINE
Payer: COMMERCIAL

## 2020-07-24 DIAGNOSIS — R92.8 ABNORMAL MAMMOGRAM OF LEFT BREAST: ICD-10-CM

## 2020-07-24 DIAGNOSIS — R92.8 ABNORMAL FINDING ON RADIOLOGICAL EXAMINATION OF BREAST: ICD-10-CM

## 2020-07-24 PROCEDURE — G0279 TOMOSYNTHESIS, MAMMO: HCPCS

## 2020-08-12 DIAGNOSIS — E78.00 HIGH CHOLESTEROL: ICD-10-CM

## 2020-08-12 RX ORDER — PRAVASTATIN SODIUM 20 MG
20 TABLET ORAL DAILY
Qty: 90 TAB | Refills: 3 | Status: SHIPPED | OUTPATIENT
Start: 2020-08-12 | End: 2020-09-02 | Stop reason: SDUPTHER

## 2020-08-12 NOTE — TELEPHONE ENCOUNTER
Received request via: Patient    Was the patient seen in the last year in this department? Yes    Does the patient have an active prescription (recently filled or refills available) for medication(s) requested? No

## 2020-09-02 ENCOUNTER — OFFICE VISIT (OUTPATIENT)
Dept: MEDICAL GROUP | Facility: PHYSICIAN GROUP | Age: 65
End: 2020-09-02
Payer: COMMERCIAL

## 2020-09-02 VITALS
HEART RATE: 71 BPM | DIASTOLIC BLOOD PRESSURE: 80 MMHG | OXYGEN SATURATION: 95 % | SYSTOLIC BLOOD PRESSURE: 118 MMHG | BODY MASS INDEX: 32.95 KG/M2 | WEIGHT: 205 LBS | RESPIRATION RATE: 14 BRPM | TEMPERATURE: 98.3 F | HEIGHT: 66 IN

## 2020-09-02 DIAGNOSIS — Z96.643 HISTORY OF TOTAL REPLACEMENT OF BOTH HIP JOINTS: ICD-10-CM

## 2020-09-02 DIAGNOSIS — R92.8 ABNORMAL MAMMOGRAM OF LEFT BREAST: ICD-10-CM

## 2020-09-02 DIAGNOSIS — R00.2 PALPITATIONS: ICD-10-CM

## 2020-09-02 DIAGNOSIS — Z11.59 NEED FOR HEPATITIS C SCREENING TEST: ICD-10-CM

## 2020-09-02 DIAGNOSIS — E55.9 VITAMIN D DEFICIENCY: ICD-10-CM

## 2020-09-02 DIAGNOSIS — I10 ESSENTIAL HYPERTENSION: ICD-10-CM

## 2020-09-02 DIAGNOSIS — Z23 NEED FOR VACCINATION: ICD-10-CM

## 2020-09-02 DIAGNOSIS — I83.92 VARICOSE VEINS OF LEFT LOWER EXTREMITY, UNSPECIFIED WHETHER COMPLICATED: ICD-10-CM

## 2020-09-02 DIAGNOSIS — E78.00 HIGH CHOLESTEROL: ICD-10-CM

## 2020-09-02 DIAGNOSIS — F51.01 PRIMARY INSOMNIA: Chronic | ICD-10-CM

## 2020-09-02 DIAGNOSIS — E66.9 OBESITY (BMI 30-39.9): ICD-10-CM

## 2020-09-02 PROCEDURE — 99214 OFFICE O/P EST MOD 30 MIN: CPT | Performed by: INTERNAL MEDICINE

## 2020-09-02 RX ORDER — DICLOFENAC POTASSIUM 50 MG/1
TABLET, FILM COATED ORAL
COMMUNITY
End: 2020-09-02

## 2020-09-02 RX ORDER — DILTIAZEM HYDROCHLORIDE 120 MG/1
CAPSULE, COATED, EXTENDED RELEASE ORAL
COMMUNITY
End: 2020-09-02

## 2020-09-02 RX ORDER — PRAVASTATIN SODIUM 20 MG
TABLET ORAL
COMMUNITY
End: 2020-09-02

## 2020-09-02 RX ORDER — PNEUMOCOCCAL 13-VALENT CONJUGATE VACCINE 2.2; 2.2; 2.2; 2.2; 2.2; 4.4; 2.2; 2.2; 2.2; 2.2; 2.2; 2.2; 2.2 UG/.5ML; UG/.5ML; UG/.5ML; UG/.5ML; UG/.5ML; UG/.5ML; UG/.5ML; UG/.5ML; UG/.5ML; UG/.5ML; UG/.5ML; UG/.5ML; UG/.5ML
INJECTION, SUSPENSION INTRAMUSCULAR
COMMUNITY
End: 2020-09-02

## 2020-09-02 RX ORDER — CELECOXIB 100 MG/1
CAPSULE ORAL
COMMUNITY
End: 2020-09-02

## 2020-09-02 RX ORDER — DILTIAZEM HYDROCHLORIDE 120 MG/1
120 CAPSULE, COATED, EXTENDED RELEASE ORAL DAILY
Qty: 90 CAP | Refills: 3 | Status: SHIPPED | OUTPATIENT
Start: 2020-09-02

## 2020-09-02 RX ORDER — AMOXICILLIN 500 MG/1
CAPSULE ORAL
COMMUNITY

## 2020-09-02 RX ORDER — LOVASTATIN 20 MG/1
TABLET ORAL
COMMUNITY
End: 2020-09-02

## 2020-09-02 RX ORDER — LISINOPRIL 5 MG/1
TABLET ORAL
COMMUNITY
End: 2020-09-02

## 2020-09-02 RX ORDER — ZOSTER VACCINE LIVE 19400 [PFU]/.65ML
INJECTION, POWDER, LYOPHILIZED, FOR SUSPENSION SUBCUTANEOUS
COMMUNITY
End: 2020-09-02

## 2020-09-02 RX ORDER — PRAVASTATIN SODIUM 20 MG
20 TABLET ORAL DAILY
Qty: 90 TAB | Refills: 3 | Status: SHIPPED | OUTPATIENT
Start: 2020-09-02

## 2020-09-02 RX ORDER — ZOLPIDEM TARTRATE 10 MG/1
TABLET ORAL
COMMUNITY
End: 2020-09-02

## 2020-09-02 RX ORDER — ZOLPIDEM TARTRATE 10 MG/1
10 TABLET ORAL NIGHTLY PRN
Qty: 30 TAB | Refills: 5 | Status: SHIPPED | OUTPATIENT
Start: 2020-09-02 | End: 2020-09-17

## 2020-09-02 RX ORDER — TIZANIDINE 4 MG/1
TABLET ORAL
COMMUNITY
End: 2020-09-02

## 2020-09-02 RX ORDER — ZOLPIDEM TARTRATE 10 MG/1
TABLET ORAL
COMMUNITY
Start: 2020-08-01 | End: 2020-09-02 | Stop reason: SDUPTHER

## 2020-09-02 RX ORDER — AMOXICILLIN 875 MG/1
TABLET, COATED ORAL
COMMUNITY
End: 2020-09-02

## 2020-09-02 RX ORDER — CELECOXIB 100 MG/1
100 CAPSULE ORAL
Qty: 90 CAP | Refills: 3 | Status: SHIPPED | OUTPATIENT
Start: 2020-09-02 | End: 2021-09-28

## 2020-09-02 RX ORDER — FLUTICASONE PROPIONATE 50 MCG
SPRAY, SUSPENSION (ML) NASAL
COMMUNITY
End: 2020-09-02

## 2020-09-02 ASSESSMENT — FIBROSIS 4 INDEX: FIB4 SCORE: 1.8

## 2020-09-02 ASSESSMENT — PATIENT HEALTH QUESTIONNAIRE - PHQ9: CLINICAL INTERPRETATION OF PHQ2 SCORE: 0

## 2020-09-02 NOTE — ASSESSMENT & PLAN NOTE
Patient reports some soreness of the left ankle area, worse after grand daughter stepped on her ankle.  tender now if she palpates the area. No swelling.

## 2020-09-02 NOTE — PROGRESS NOTES
Chief Complaint   Patient presents with   • Annual Exam       HISTORY OF PRESENT ILLNESS: Patient is a 65 y.o. female established patient who presents today to discuss the medical issues below.    HTN (hypertension)  She continues on lisinopril 5 mg a day with ongoing diltiazem  mg a day.    Insomnia  Patient continues to struggle with insomnia.    Obesity (BMI 30-39.9)  Weight remains unchanged.    High cholesterol  Review of records indicate both Pravachol and Mevacor prescriptions.    Vitamin D deficiency  Patient continues on vitamin D supplementation.    Abnormal mammogram of left breast  Mammogram: Lipoma or prominent fat lobule in the upper outer quadrant of the left breast with the associated dystrophic calcifications. The calcifications have continued to evolve into large benign macrocalcifications likely related   to fat necrosis. Monitoring changed to annually.     Varicose vein of leg  Patient reports some soreness of the left ankle area, worse after grand daughter stepped on her ankle.  tender now if she palpates the area. No swelling.      Hx of total hip arthroplasty  Uses amoxicillin prior to dental appointments.        Patient Active Problem List    Diagnosis Date Noted   • Vitamin D deficiency 11/19/2015     Priority: Medium   • Obesity (BMI 30-39.9) 11/07/2014     Priority: Medium   • HTN (hypertension) 11/14/2013     Priority: Medium     Class: Chronic   • Insomnia      Priority: Medium   • Abnormal mammogram of left breast 09/13/2018   • Varicose vein of leg 02/19/2016   • Sprain of sacroiliac ligament 08/24/2015   • History of colonic polyps 08/16/2015   • Health care maintenance 09/25/2014   • Palpitations 09/25/2014   • Cerumen debris on tympanic membrane of both ears 09/25/2014   • Hx of total hip arthroplasty 09/26/2012   • High cholesterol        Allergies:Nkda [no known drug allergy]    Current Outpatient Medications   Medication Sig Dispense Refill   • amoxicillin (AMOXIL) 500 MG  Cap amoxicillin 500 mg capsule     • zolpidem (AMBIEN) 10 MG Tab Take 1 Tab by mouth at bedtime as needed for Sleep for up to 15 days. 30 Tab 5   • DILTIAZem CD (CARDIZEM CD) 120 MG CAPSULE SR 24 HR Take 1 Cap by mouth every day. 90 Cap 3   • pravastatin (PRAVACHOL) 20 MG Tab Take 1 Tab by mouth every day. 90 Tab 3   • celecoxib (CELEBREX) 100 MG Cap Take 1 Cap by mouth 1 time daily as needed. 90 Cap 3   • Omega-3 Fatty Acids (FISH OIL) 1000 MG Cap capsule Take 1,000 mg by mouth 3 times a day, with meals.     • MELATONIN PO Take  by mouth.     • cholecalciferol (VITAMIN D3) 5000 UNIT Cap Take 1 Cap by mouth every day. 90 Cap 3     No current facility-administered medications for this visit.          Past Medical History:   Diagnosis Date   • Anesthesia     PONV   • Cerumen debris on tympanic membrane of both ears 2014    intermittent problem, ok for now, monitor   • High cholesterol    • Insomnia    • Obesity (BMI 30-39.9) 2014   • Pain     hip   • Varicose vein of leg 2016   • Vitamin D deficiency 2015       Social History     Tobacco Use   • Smoking status: Never Smoker   • Smokeless tobacco: Never Used   Substance Use Topics   • Alcohol use: No     Alcohol/week: 0.0 oz     Comment: rare   • Drug use: No       Family Status   Relation Name Status   • Mo  Alive   • Fa   at age 73        Stoke   • Bro  Alive        4 stents   • PUnc          MI   • PGMo          MI   • Bro  Alive   • Son  Alive        Pericarditis at age 24   • OTHER  (Not Specified)     Family History   Problem Relation Age of Onset   • Hypertension Mother    • Hyperlipidemia Mother    • Heart Disease Father    • Hypertension Father    • Hyperlipidemia Father    • Stroke Father    • Heart Disease Brother    • Hypertension Brother    • Hypertension Brother    • Heart Disease Other    • Diabetes Other    • Cancer Other    • Hypertension Other        ROS:    Respiratory: Negative for cough, sputum  "production, shortness of breath or wheezing.    Cardiovascular: Negative for chest pain, palpitations, orthopnea, dyspnea with exertion or edema.   Gastrointestinal: Negative for GI upset, nausea, vomiting, abdominal pain, constipation or diarrhea.   Genitourinary: Negative for dysuria, urgency, hesitancy or frequency.       Exam:    /80 (BP Location: Left arm, Patient Position: Sitting, BP Cuff Size: Adult)   Pulse 71   Temp 36.8 °C (98.3 °F) (Temporal)   Resp 14   Ht 1.676 m (5' 6\")   Wt 93 kg (205 lb)   SpO2 95%  Body mass index is 33.09 kg/m².  General:  Well nourished, well developed female in NAD.  Pulmonary: Clear to ausculation and percussion.  Normal effort. No rales, rhonchi, or wheezing.  Cardiovascular: Regular rate and rhythm without murmur.   Abdomen: Normal bowel sounds soft and nontender no palpable liver spleen bladder mass.  Extremities: No LE edema noted.  Neuro: Grossly nonfocal.  Psych: Alert and oriented to person, place, and time. Appropriate mood and conversation.    Labs not done yet    This dictation was created using voice recognition software. I have made reasonable attempts to correct errors, however, errors of grammar and content may exist.          Assessment/Plan:    1. Need for hepatitis C screening test  Added to scheduled labs  - HCV Scrn ( [id number] 1xLife); Future    2. Need for vaccination  Start vaccination sequence  - Shingrix Vaccine    3. Essential hypertension  Well-controlled medication refills written  - DILTIAZem CD (CARDIZEM CD) 120 MG CAPSULE SR 24 HR; Take 1 Cap by mouth every day.  Dispense: 90 Cap; Refill: 3    4. Primary insomnia  Risks benefits options for therapy, conservative sleep hygiene discussed.  Refills written no evidence of adverse reaction or abuse.  Reiterated need for office visit for additional refills.  This patient is continuing to use a controlled substance (CS) on a long term basis.  The patient is thoroughly aware of the goals of " treatment with the CS  The patient is aware that yearly and random urine drug screens are required.  The patient has been instructed to take the CS only as prescribed.  The patient is prohibited from sharing the CS with any other person.  The patient is instructed to inform the provider if any other CS is taken, of any alcohol or cannabis or other recreational drug use, any treatment for side effects of the CS or complications, if they have CS active rx in other states  The patient has evidence for a reason for the CS  The treatment plan has been discussed with the patient  The  report has been reviewed        - zolpidem (AMBIEN) 10 MG Tab; Take 1 Tab by mouth at bedtime as needed for Sleep for up to 15 days.  Dispense: 30 Tab; Refill: 5    5. Obesity (BMI 30-39.9)  Working on diet    6. High cholesterol  Labs are scheduled Pravachol refill written  - pravastatin (PRAVACHOL) 20 MG Tab; Take 1 Tab by mouth every day.  Dispense: 90 Tab; Refill: 3    7. Vitamin D deficiency  Vitamin D supplementation will assess levels    8. Abnormal mammogram of left breast  Patient has been returned to once a year mammograms from radiology    9. Varicose veins of left lower extremity, unspecified whether complicated  No severe dilation.  Discussed implications.  Support stockings.  Consideration for referral as needed painful    10. History of total replacement of both hip joints  Continuing on Celebrex on a regular basis.  Risks benefits discussed.    11. Palpitations  Stable with blood pressure control with diltiazem.  - DILTIAZem CD (CARDIZEM CD) 120 MG CAPSULE SR 24 HR; Take 1 Cap by mouth every day.  Dispense: 90 Cap; Refill: 3    Patient was seen for 25 minutes face to face of which more than 50% of the time was spent in counseling and coordination of care regarding the above problems.

## 2020-09-02 NOTE — ASSESSMENT & PLAN NOTE
Mammogram: Lipoma or prominent fat lobule in the upper outer quadrant of the left breast with the associated dystrophic calcifications. The calcifications have continued to evolve into large benign macrocalcifications likely related   to fat necrosis. Monitoring changed to annually.

## 2020-09-09 ENCOUNTER — TELEPHONE (OUTPATIENT)
Dept: MEDICAL GROUP | Facility: PHYSICIAN GROUP | Age: 65
End: 2020-09-09

## 2020-09-14 ENCOUNTER — HOSPITAL ENCOUNTER (OUTPATIENT)
Dept: LAB | Facility: MEDICAL CENTER | Age: 65
End: 2020-09-14
Attending: INTERNAL MEDICINE
Payer: COMMERCIAL

## 2020-09-14 DIAGNOSIS — E55.9 VITAMIN D DEFICIENCY: ICD-10-CM

## 2020-09-14 DIAGNOSIS — I10 ESSENTIAL HYPERTENSION: ICD-10-CM

## 2020-09-14 DIAGNOSIS — E78.00 HIGH CHOLESTEROL: ICD-10-CM

## 2020-09-14 DIAGNOSIS — Z11.59 NEED FOR HEPATITIS C SCREENING TEST: ICD-10-CM

## 2020-09-14 LAB
25(OH)D3 SERPL-MCNC: 50 NG/ML (ref 30–100)
ALBUMIN SERPL BCP-MCNC: 4.2 G/DL (ref 3.2–4.9)
ALBUMIN/GLOB SERPL: 1.6 G/DL
ALP SERPL-CCNC: 89 U/L (ref 30–99)
ALT SERPL-CCNC: 12 U/L (ref 2–50)
ANION GAP SERPL CALC-SCNC: 12 MMOL/L (ref 7–16)
AST SERPL-CCNC: 16 U/L (ref 12–45)
BASOPHILS # BLD AUTO: 1 % (ref 0–1.8)
BASOPHILS # BLD: 0.06 K/UL (ref 0–0.12)
BILIRUB SERPL-MCNC: 1 MG/DL (ref 0.1–1.5)
BUN SERPL-MCNC: 14 MG/DL (ref 8–22)
CALCIUM SERPL-MCNC: 9.5 MG/DL (ref 8.5–10.5)
CHLORIDE SERPL-SCNC: 102 MMOL/L (ref 96–112)
CHOLEST SERPL-MCNC: 185 MG/DL (ref 100–199)
CO2 SERPL-SCNC: 26 MMOL/L (ref 20–33)
CREAT SERPL-MCNC: 0.73 MG/DL (ref 0.5–1.4)
EOSINOPHIL # BLD AUTO: 0.13 K/UL (ref 0–0.51)
EOSINOPHIL NFR BLD: 2.2 % (ref 0–6.9)
ERYTHROCYTE [DISTWIDTH] IN BLOOD BY AUTOMATED COUNT: 43.8 FL (ref 35.9–50)
FASTING STATUS PATIENT QL REPORTED: NORMAL
GLOBULIN SER CALC-MCNC: 2.6 G/DL (ref 1.9–3.5)
GLUCOSE SERPL-MCNC: 92 MG/DL (ref 65–99)
HCT VFR BLD AUTO: 44.9 % (ref 37–47)
HCV AB SER QL: NORMAL
HDLC SERPL-MCNC: 66 MG/DL
HGB BLD-MCNC: 15.2 G/DL (ref 12–16)
IMM GRANULOCYTES # BLD AUTO: 0.01 K/UL (ref 0–0.11)
IMM GRANULOCYTES NFR BLD AUTO: 0.2 % (ref 0–0.9)
LDLC SERPL CALC-MCNC: 93 MG/DL
LYMPHOCYTES # BLD AUTO: 2.76 K/UL (ref 1–4.8)
LYMPHOCYTES NFR BLD: 47.4 % (ref 22–41)
MCH RBC QN AUTO: 31.7 PG (ref 27–33)
MCHC RBC AUTO-ENTMCNC: 33.9 G/DL (ref 33.6–35)
MCV RBC AUTO: 93.7 FL (ref 81.4–97.8)
MONOCYTES # BLD AUTO: 0.39 K/UL (ref 0–0.85)
MONOCYTES NFR BLD AUTO: 6.7 % (ref 0–13.4)
NEUTROPHILS # BLD AUTO: 2.47 K/UL (ref 2–7.15)
NEUTROPHILS NFR BLD: 42.5 % (ref 44–72)
NRBC # BLD AUTO: 0 K/UL
NRBC BLD-RTO: 0 /100 WBC
PLATELET # BLD AUTO: 211 K/UL (ref 164–446)
PMV BLD AUTO: 11.8 FL (ref 9–12.9)
POTASSIUM SERPL-SCNC: 4.2 MMOL/L (ref 3.6–5.5)
PROT SERPL-MCNC: 6.8 G/DL (ref 6–8.2)
RBC # BLD AUTO: 4.79 M/UL (ref 4.2–5.4)
SODIUM SERPL-SCNC: 140 MMOL/L (ref 135–145)
TRIGL SERPL-MCNC: 128 MG/DL (ref 0–149)
WBC # BLD AUTO: 5.8 K/UL (ref 4.8–10.8)

## 2020-09-14 PROCEDURE — 80061 LIPID PANEL: CPT

## 2020-09-14 PROCEDURE — 82306 VITAMIN D 25 HYDROXY: CPT

## 2020-09-14 PROCEDURE — G0472 HEP C SCREEN HIGH RISK/OTHER: HCPCS

## 2020-09-14 PROCEDURE — 80053 COMPREHEN METABOLIC PANEL: CPT

## 2020-09-14 PROCEDURE — 36415 COLL VENOUS BLD VENIPUNCTURE: CPT

## 2020-09-14 PROCEDURE — 85025 COMPLETE CBC W/AUTO DIFF WBC: CPT

## 2020-09-16 NOTE — TELEPHONE ENCOUNTER
----- Message from Magali Davidson sent at 9/8/2020 10:47 PM PDT -----  Regarding: Prescription Question  Contact: [phone]  At my recent office visit with Dr. Villarreal on Sept 2, I didn't receive the written prescription for Zopildem (Ambien). Is it still possible to pick it up?  I have been trying Melatonin this past week with little to no results.  Thank you in advance.  Magali Davidson  
Call script in to pharmacy  With inna [facility] for pt with the information.   
Please call rx into the pharmacy [id number] with 5 refills.    
Dr. Jenelle Siddiqui
Dr. Siddiqui
ED

## 2020-11-24 RX ORDER — PRAVASTATIN SODIUM 20 MG
20 TABLET ORAL
Qty: 90 TAB | Refills: 2 | OUTPATIENT
Start: 2020-11-24